# Patient Record
Sex: MALE | Race: WHITE | NOT HISPANIC OR LATINO | Employment: FULL TIME | URBAN - METROPOLITAN AREA
[De-identification: names, ages, dates, MRNs, and addresses within clinical notes are randomized per-mention and may not be internally consistent; named-entity substitution may affect disease eponyms.]

---

## 2022-08-20 DIAGNOSIS — Z00.00 ENCOUNTER FOR PREVENTIVE HEALTH EXAMINATION: ICD-10-CM

## 2022-09-27 ENCOUNTER — HOSPITAL ENCOUNTER (OUTPATIENT)
Dept: CT IMAGING | Facility: HOSPITAL | Age: 42
Discharge: HOME | End: 2022-09-27

## 2022-09-27 ENCOUNTER — APPOINTMENT (OUTPATIENT)
Dept: LAB | Facility: CLINIC | Age: 42
End: 2022-09-27

## 2022-09-27 ENCOUNTER — HOSPITAL ENCOUNTER (OUTPATIENT)
Dept: NON INVASIVE DIAGNOSTICS | Facility: CLINIC | Age: 42
Discharge: HOME | End: 2022-09-27

## 2022-09-27 ENCOUNTER — HOSPITAL ENCOUNTER (OUTPATIENT)
Dept: ULTRASOUND IMAGING | Facility: HOSPITAL | Age: 42
Discharge: HOME | End: 2022-09-27

## 2022-09-27 ENCOUNTER — HOSPITAL ENCOUNTER (OUTPATIENT)
Dept: VASCULAR ULTRASOUND | Facility: HOSPITAL | Age: 42
Discharge: HOME | End: 2022-09-27

## 2022-09-27 ENCOUNTER — OFFICE VISIT (OUTPATIENT)
Dept: FAMILY MEDICINE CLINIC | Facility: CLINIC | Age: 42
End: 2022-09-27

## 2022-09-27 VITALS
HEART RATE: 78 BPM | WEIGHT: 255.6 LBS | DIASTOLIC BLOOD PRESSURE: 86 MMHG | HEIGHT: 74 IN | SYSTOLIC BLOOD PRESSURE: 136 MMHG | BODY MASS INDEX: 32.8 KG/M2 | RESPIRATION RATE: 14 BRPM

## 2022-09-27 VITALS
HEIGHT: 74 IN | DIASTOLIC BLOOD PRESSURE: 86 MMHG | BODY MASS INDEX: 32.73 KG/M2 | WEIGHT: 255 LBS | HEART RATE: 78 BPM | SYSTOLIC BLOOD PRESSURE: 136 MMHG

## 2022-09-27 DIAGNOSIS — Z86.010 HISTORY OF COLON POLYPS: ICD-10-CM

## 2022-09-27 DIAGNOSIS — G89.29 CHRONIC RIGHT SHOULDER PAIN: ICD-10-CM

## 2022-09-27 DIAGNOSIS — M25.511 CHRONIC RIGHT SHOULDER PAIN: ICD-10-CM

## 2022-09-27 DIAGNOSIS — Z00.00 ENCOUNTER FOR PREVENTIVE HEALTH EXAMINATION: ICD-10-CM

## 2022-09-27 DIAGNOSIS — I10 PRIMARY HYPERTENSION: ICD-10-CM

## 2022-09-27 DIAGNOSIS — K76.0 FATTY LIVER: ICD-10-CM

## 2022-09-27 DIAGNOSIS — Z00.00 WELL ADULT EXAM: Primary | ICD-10-CM

## 2022-09-27 DIAGNOSIS — L98.9 SKIN LESION: ICD-10-CM

## 2022-09-27 DIAGNOSIS — E66.09 CLASS 1 OBESITY DUE TO EXCESS CALORIES WITH SERIOUS COMORBIDITY AND BODY MASS INDEX (BMI) OF 32.0 TO 32.9 IN ADULT: ICD-10-CM

## 2022-09-27 DIAGNOSIS — E11.9 TYPE 2 DIABETES MELLITUS WITHOUT COMPLICATION, WITHOUT LONG-TERM CURRENT USE OF INSULIN (HCC): ICD-10-CM

## 2022-09-27 DIAGNOSIS — I77.810 THORACIC AORTIC ECTASIA (HCC): ICD-10-CM

## 2022-09-27 PROBLEM — R74.01 ELEVATED ALT MEASUREMENT: Status: RESOLVED | Noted: 2022-09-27 | Resolved: 2022-09-27

## 2022-09-27 PROBLEM — Z86.0100 HISTORY OF COLON POLYPS: Status: ACTIVE | Noted: 2022-09-27

## 2022-09-27 PROBLEM — R74.01 ELEVATED ALT MEASUREMENT: Status: ACTIVE | Noted: 2022-09-27

## 2022-09-27 PROBLEM — E66.811 CLASS 1 OBESITY DUE TO EXCESS CALORIES WITH SERIOUS COMORBIDITY AND BODY MASS INDEX (BMI) OF 32.0 TO 32.9 IN ADULT: Status: ACTIVE | Noted: 2022-09-27

## 2022-09-27 LAB
25(OH)D3 SERPL-MCNC: 40.2 NG/ML (ref 30–100)
ALBUMIN SERPL BCP-MCNC: 4.5 G/DL (ref 3.5–5)
ALP SERPL-CCNC: 73 U/L (ref 34–104)
ALT SERPL W P-5'-P-CCNC: 67 U/L (ref 7–52)
ANION GAP SERPL CALCULATED.3IONS-SCNC: 10 MMOL/L (ref 4–13)
AORTIC ROOT: 3.2 CM
APICAL FOUR CHAMBER EJECTION FRACTION: 65 %
ASCENDING AORTA: 3.3 CM
AST SERPL W P-5'-P-CCNC: 32 U/L (ref 13–39)
ATRIAL RATE: 91 BPM
BACTERIA UR QL AUTO: ABNORMAL /HPF
BASOPHILS # BLD AUTO: 0.05 THOUSANDS/ΜL (ref 0–0.1)
BASOPHILS NFR BLD AUTO: 1 % (ref 0–1)
BILIRUB SERPL-MCNC: 0.8 MG/DL (ref 0.2–1)
BILIRUB UR QL STRIP: NEGATIVE
BUN SERPL-MCNC: 14 MG/DL (ref 5–25)
CALCIUM SERPL-MCNC: 9.1 MG/DL (ref 8.4–10.2)
CHLORIDE SERPL-SCNC: 102 MMOL/L (ref 96–108)
CHOLEST SERPL-MCNC: 128 MG/DL
CLARITY UR: CLEAR
CO2 SERPL-SCNC: 24 MMOL/L (ref 21–32)
COLOR UR: ABNORMAL
CREAT SERPL-MCNC: 1.01 MG/DL (ref 0.6–1.3)
CRP SERPL HS-MCNC: 1.88 MG/L
E WAVE DECELERATION TIME: 191 MS
EOSINOPHIL # BLD AUTO: 0.1 THOUSAND/ΜL (ref 0–0.61)
EOSINOPHIL NFR BLD AUTO: 2 % (ref 0–6)
ERYTHROCYTE [DISTWIDTH] IN BLOOD BY AUTOMATED COUNT: 12.2 % (ref 11.6–15.1)
EST. AVERAGE GLUCOSE BLD GHB EST-MCNC: 209 MG/DL
FRACTIONAL SHORTENING: 36 % (ref 28–44)
GFR SERPL CREATININE-BSD FRML MDRD: 91 ML/MIN/1.73SQ M
GLUCOSE P FAST SERPL-MCNC: 177 MG/DL (ref 65–99)
GLUCOSE UR STRIP-MCNC: ABNORMAL MG/DL
HBA1C MFR BLD: 8.9 %
HCT VFR BLD AUTO: 46.6 % (ref 36.5–49.3)
HCV AB SER QL: NORMAL
HDLC SERPL-MCNC: 38 MG/DL
HGB BLD-MCNC: 16.6 G/DL (ref 12–17)
HGB UR QL STRIP.AUTO: NEGATIVE
IMM GRANULOCYTES # BLD AUTO: 0.03 THOUSAND/UL (ref 0–0.2)
IMM GRANULOCYTES NFR BLD AUTO: 1 % (ref 0–2)
INTERVENTRICULAR SEPTUM IN DIASTOLE (PARASTERNAL SHORT AXIS VIEW): 1.1 CM
INTERVENTRICULAR SEPTUM: 1.1 CM (ref 0.6–1.1)
KETONES UR STRIP-MCNC: NEGATIVE MG/DL
LAAS-AP2: 24 CM2
LAAS-AP4: 17.9 CM2
LDLC SERPL CALC-MCNC: 64 MG/DL (ref 0–100)
LEFT ATRIUM SIZE: 3.6 CM
LEFT INTERNAL DIMENSION IN SYSTOLE: 3 CM (ref 2.1–4)
LEFT VENTRICULAR INTERNAL DIMENSION IN DIASTOLE: 4.7 CM (ref 3.5–6)
LEFT VENTRICULAR POSTERIOR WALL IN END DIASTOLE: 1.1 CM
LEFT VENTRICULAR STROKE VOLUME: 64 ML
LEUKOCYTE ESTERASE UR QL STRIP: NEGATIVE
LVSV (TEICH): 64 ML
LYMPHOCYTES # BLD AUTO: 1.99 THOUSANDS/ΜL (ref 0.6–4.47)
LYMPHOCYTES NFR BLD AUTO: 34 % (ref 14–44)
MAX HR PERCENT: 104 %
MAX HR: 187 BPM
MCH RBC QN AUTO: 33 PG (ref 26.8–34.3)
MCHC RBC AUTO-ENTMCNC: 35.6 G/DL (ref 31.4–37.4)
MCV RBC AUTO: 93 FL (ref 82–98)
MONOCYTES # BLD AUTO: 0.42 THOUSAND/ΜL (ref 0.17–1.22)
MONOCYTES NFR BLD AUTO: 7 % (ref 4–12)
MV E'TISSUE VEL-SEP: 8 CM/S
MV PEAK A VEL: 0.65 M/S
MV PEAK E VEL: 60 CM/S
MV STENOSIS PRESSURE HALF TIME: 56 MS
MV VALVE AREA P 1/2 METHOD: 3.93 CM2
NEUTROPHILS # BLD AUTO: 3.33 THOUSANDS/ΜL (ref 1.85–7.62)
NEUTS SEG NFR BLD AUTO: 55 % (ref 43–75)
NITRITE UR QL STRIP: NEGATIVE
NON-SQ EPI CELLS URNS QL MICRO: ABNORMAL /HPF
NRBC BLD AUTO-RTO: 0 /100 WBCS
P AXIS: 20 DEGREES
PH UR STRIP.AUTO: 5.5 [PH]
PLATELET # BLD AUTO: 249 THOUSANDS/UL (ref 149–390)
PMV BLD AUTO: 8 FL (ref 8.9–12.7)
POTASSIUM SERPL-SCNC: 4.2 MMOL/L (ref 3.5–5.3)
PR INTERVAL: 134 MS
PROT SERPL-MCNC: 7.4 G/DL (ref 6.4–8.4)
PROT UR STRIP-MCNC: NEGATIVE MG/DL
PSA SERPL-MCNC: 0.6 NG/ML (ref 0–4)
QRS AXIS: 0 DEGREES
QRSD INTERVAL: 98 MS
QT INTERVAL: 356 MS
QTC INTERVAL: 437 MS
RATE PRESSURE PRODUCT: NORMAL
RBC # BLD AUTO: 5.03 MILLION/UL (ref 3.88–5.62)
RBC #/AREA URNS AUTO: ABNORMAL /HPF
RIGHT ATRIUM AREA SYSTOLE A4C: 20.5 CM2
RIGHT VENTRICLE ID DIMENSION: 4.5 CM
SL CV LEFT ATRIUM LENGTH A2C: 5.9 CM
SL CV LV EF: 55
SL CV PED ECHO LEFT VENTRICLE DIASTOLIC VOLUME (MOD BIPLANE) 2D: 100 ML
SL CV PED ECHO LEFT VENTRICLE SYSTOLIC VOLUME (MOD BIPLANE) 2D: 36 ML
SL CV STRESS RECOVERY BP: NORMAL MMHG
SL CV STRESS RECOVERY HR: 125 BPM
SL CV STRESS RECOVERY O2 SAT: 98 %
SODIUM SERPL-SCNC: 136 MMOL/L (ref 135–147)
SP GR UR STRIP.AUTO: 1.01 (ref 1–1.03)
STRESS ANGINA INDEX: 0
STRESS BASELINE BP: NORMAL MMHG
STRESS BASELINE HR: 96 BPM
STRESS O2 SAT REST: 98 %
STRESS PEAK HR: 187 BPM
STRESS POST ESTIMATED WORKLOAD: 11.7 METS
STRESS POST EXERCISE DUR MIN: 10 MIN
STRESS POST O2 SAT PEAK: 97 %
STRESS POST PEAK BP: 184 MMHG
T WAVE AXIS: 30 DEGREES
TR MAX PG: 18 MMHG
TR PEAK VELOCITY: 2.1 M/S
TRICUSPID VALVE PEAK REGURGITATION VELOCITY: 2.14 M/S
TRIGL SERPL-MCNC: 130 MG/DL
TSH SERPL DL<=0.05 MIU/L-ACNC: 1.77 UIU/ML (ref 0.45–4.5)
UROBILINOGEN UR STRIP-ACNC: <2 MG/DL
VENTRICULAR RATE: 91 BPM
WBC # BLD AUTO: 5.92 THOUSAND/UL (ref 4.31–10.16)
WBC #/AREA URNS AUTO: ABNORMAL /HPF

## 2022-09-27 PROCEDURE — 85025 COMPLETE CBC W/AUTO DIFF WBC: CPT

## 2022-09-27 PROCEDURE — 93306 TTE W/DOPPLER COMPLETE: CPT | Performed by: INTERNAL MEDICINE

## 2022-09-27 PROCEDURE — 99499EX: Performed by: FAMILY MEDICINE

## 2022-09-27 PROCEDURE — 83036 HEMOGLOBIN GLYCOSYLATED A1C: CPT

## 2022-09-27 PROCEDURE — 86141 C-REACTIVE PROTEIN HS: CPT

## 2022-09-27 PROCEDURE — 93350 STRESS TTE ONLY: CPT | Performed by: INTERNAL MEDICINE

## 2022-09-27 PROCEDURE — 93922 UPR/L XTREMITY ART 2 LEVELS: CPT

## 2022-09-27 PROCEDURE — 81001 URINALYSIS AUTO W/SCOPE: CPT

## 2022-09-27 PROCEDURE — 93306 TTE W/DOPPLER COMPLETE: CPT

## 2022-09-27 PROCEDURE — 86803 HEPATITIS C AB TEST: CPT

## 2022-09-27 PROCEDURE — 84443 ASSAY THYROID STIM HORMONE: CPT

## 2022-09-27 PROCEDURE — 80061 LIPID PANEL: CPT

## 2022-09-27 PROCEDURE — 76700 US EXAM ABDOM COMPLETE: CPT

## 2022-09-27 PROCEDURE — 93350 STRESS TTE ONLY: CPT

## 2022-09-27 PROCEDURE — 80053 COMPREHEN METABOLIC PANEL: CPT

## 2022-09-27 PROCEDURE — 82306 VITAMIN D 25 HYDROXY: CPT

## 2022-09-27 PROCEDURE — VASC: Performed by: SURGERY

## 2022-09-27 PROCEDURE — 93005 ELECTROCARDIOGRAM TRACING: CPT

## 2022-09-27 PROCEDURE — 75571 CT HRT W/O DYE W/CA TEST: CPT

## 2022-09-27 PROCEDURE — 84153 ASSAY OF PSA TOTAL: CPT

## 2022-09-27 PROCEDURE — 36415 COLL VENOUS BLD VENIPUNCTURE: CPT

## 2022-09-27 PROCEDURE — 93010 ELECTROCARDIOGRAM REPORT: CPT | Performed by: INTERNAL MEDICINE

## 2022-09-27 PROCEDURE — G1004 CDSM NDSC: HCPCS

## 2022-09-27 RX ORDER — GLIPIZIDE 5 MG/1
5 TABLET, FILM COATED, EXTENDED RELEASE ORAL 2 TIMES DAILY
Qty: 60 TABLET | Refills: 0
Start: 2022-09-27 | End: 2022-10-05 | Stop reason: ALTCHOICE

## 2022-09-27 RX ORDER — SIMVASTATIN 20 MG
20 TABLET ORAL
Qty: 30 TABLET | Refills: 0
Start: 2022-09-27

## 2022-09-27 RX ORDER — LISINOPRIL 10 MG/1
10 TABLET ORAL DAILY
Qty: 90 TABLET | Refills: 0 | Status: SHIPPED | OUTPATIENT
Start: 2022-09-27

## 2022-09-27 NOTE — PROGRESS NOTES
[facility] Physical Exam     Breanna Mccormick is a 39 y o  male who is presenting for his first [facility] Physical Exam at [address]  Mr Vanesa Hammond is the  at [employer]  He resides in [place], [place] with his wife and 2 children  Mr Brown's past medical history is significant for diabetes  He states his control has been variable (last A1c was in the 7s, but he has been as low as 4)  Also history of ongoing right shoulder problems, for which she has been seeing Orthopedics  His past surgical history is significant for tonsillectomy in 2002, and LASIK in 2012  His family history is significant for diabetes, hypertension, hyperlipidemia, and chronic kidney disease    Mr Vanesa Hammond is a nonsmoker  He drinks approximately 3-4 alcoholic beverages per week on average  He drinks 1 cup of coffee per day  He is  with 2 children  He states his diet is somewhat healthy  He does not exercise as often as he would like  His biggest concerns today include his diabetes, diet, exercise, and weight  Review of Systems   Constitutional: Negative for chills and fever  HENT: Negative for ear pain and sore throat  Eyes: Negative for pain and visual disturbance  Respiratory: Negative for cough and shortness of breath  Cardiovascular: Negative for chest pain and palpitations  Gastrointestinal: Negative for abdominal pain and vomiting  Genitourinary: Negative for dysuria and hematuria  Musculoskeletal: Positive for arthralgias  Negative for back pain  Skin: Negative for color change and rash  Neurological: Negative for seizures and syncope  All other systems reviewed and are negative          Active Ambulatory Problems     Diagnosis Date Noted    Well adult exam 09/27/2022    Skin lesion 09/27/2022    Type 2 diabetes mellitus without complication, without long-term current use of insulin ([facility] ) 09/27/2022    Chronic right shoulder pain 09/27/2022    History of colon polyps 09/27/2022    Class 1 obesity due to excess calories with serious comorbidity and body mass index (BMI) of 32 0 to 32 9 in adult 09/27/2022    Fatty liver 09/27/2022    Primary hypertension 09/27/2022    [facility] 09/27/2022     Resolved Ambulatory Problems     Diagnosis Date Noted    Elevated ALT measurement 09/27/2022     Past Medical History:   Diagnosis Date    Diabetes mellitus ([facility] )        Past Surgical History:   Procedure Laterality Date    EYE SURGERY  2012    LASIK    TONSILLECTOMY Bilateral 2002       Family History   Problem Relation Age of Onset    Hypertension Mother     Hyperlipidemia Mother     Diabetes Mother     Hypertension Father     Diabetes Father        Social History     Tobacco Use   Smoking Status Never Smoker   Smokeless Tobacco Never Used         Current Outpatient Medications:     glipiZIDE (GLUCOTROL XL) 5 mg 24 hr tablet, Take 1 tablet (5 mg total) by mouth 2 (two) times a day, Disp: 60 tablet, Rfl: 0    lisinopril (ZESTRIL) 10 mg tablet, Take 1 tablet (10 mg total) by mouth daily, Disp: 90 tablet, Rfl: 0    metFORMIN (GLUCOPHAGE) 500 mg tablet, Take 1 tablet (500 mg total) by mouth 2 (two) times a day with meals, Disp: 60 tablet, Rfl: 0    simvastatin (ZOCOR) 20 mg tablet, Take 1 tablet (20 mg total) by mouth daily at bedtime, Disp: 30 tablet, Rfl: 0    No Known Allergies      Objective:    Vitals:    09/27/22 1053   BP: 136/86   Pulse: 78   Resp: 14   Weight: 116 kg (255 lb 9 6 oz)   Height: 6' 2" (1 88 m)        Physical Exam  Constitutional:       Appearance: Normal appearance  HENT:      Head: Normocephalic and atraumatic  Right Ear: Tympanic membrane, ear canal and external ear normal  There is no impacted cerumen  Left Ear: Tympanic membrane, ear canal and external ear normal  There is no impacted cerumen        Nose: Nose normal       Mouth/Throat:      Mouth: Mucous membranes are moist       Pharynx: Oropharynx is clear  No posterior oropharyngeal erythema  Eyes:      Extraocular Movements: Extraocular movements intact  Conjunctiva/sclera: Conjunctivae normal       Pupils: Pupils are equal, round, and reactive to light  Neck:      Vascular: No carotid bruit  Cardiovascular:      Rate and Rhythm: Normal rate and regular rhythm  Pulses: Normal pulses  Heart sounds: Normal heart sounds  No murmur heard  Pulmonary:      Effort: Pulmonary effort is normal       Breath sounds: Normal breath sounds  Abdominal:      General: Abdomen is flat  Bowel sounds are normal  There is no distension  Palpations: Abdomen is soft  There is no mass  Tenderness: There is no abdominal tenderness  Hernia: No hernia is present  Genitourinary:     Prostate: Normal       Rectum: Guaiac result negative  Musculoskeletal:         General: Normal range of motion  Cervical back: Normal range of motion and neck supple  Lymphadenopathy:      Cervical: No cervical adenopathy  Skin:     General: Skin is warm  Capillary Refill: Capillary refill takes less than 2 seconds  Coloration: Skin is not jaundiced  Findings: No rash  Neurological:      General: No focal deficit present  Mental Status: He is alert and oriented to person, place, and time  Cranial Nerves: No cranial nerve deficit  Motor: No weakness  Gait: Gait normal    Psychiatric:         Mood and Affect: Mood normal          Behavior: Behavior normal          Thought Content: Thought content normal          Judgment: Judgment normal              Assessment/Plan:     Here are the findings from today's exam:(also see cardiology and dermatology reports)        1  Well adult exam  Assessment & Plan:  (also please see assessment/plan for listed diagnoses)  Your current with colon cancer screening  Recommend repeat colonoscopy in 5 years due to history of colon polyps    Your last tetanus booster was 2022 (next booster due in 10 years)  You have had COVID vaccination, and booster  Recommend continue healthy diet and regular exercise program       2  Type 2 diabetes mellitus without complication, without long-term current use of insulin [facility])  Assessment & Plan:    Lab Results   Component Value Date    HGBA1C 8 9 (H) 09/27/2022   Unfortunately your diabetic control is poor  Your current A1c is 8 9%  I would recommend contacting your family doctor in the near future for recommendations  I will also place an order for you to see an endocrinologist     Orders:  -     metFORMIN (GLUCOPHAGE) 500 mg tablet; Take 1 tablet (500 mg total) by mouth 2 (two) times a day with meals  -     glipiZIDE (GLUCOTROL XL) 5 mg 24 hr tablet; Take 1 tablet (5 mg total) by mouth 2 (two) times a day  -     simvastatin (ZOCOR) 20 mg tablet; Take 1 tablet (20 mg total) by mouth daily at bedtime  -     Ambulatory Referral to Endocrinology; Future    3  Primary hypertension  Assessment & Plan: Your blood pressure today was suboptimal   Due to the fact that you are diabetic, I would recommend starting a medication to help lower your blood pressure, and protect her kidneys  Medication is call lisinopril 10 mg, 1 tablet daily  This medication is usually very well tolerated, but a small percentage of people will develop a dry cough  If this occurs, please contact me  I will send prescription to your pharmacy  I would recommend having her blood pressure recheck again within the next few months  Orders:  -     lisinopril (ZESTRIL) 10 mg tablet; Take 1 tablet (10 mg total) by mouth daily    4  Class 1 obesity due to excess calories with serious comorbidity and body mass index (BMI) of 32 0 to 32 9 in adult  Assessment & Plan: Your current weight today is 255 lb (BMI 32 82)  This puts you in the "obese" category  I would recommend at least a 20-30 lb weight loss    Please follow the recommendations/advice given to you by our registered dietitian and exercise physiologist       5  Thoracic aortic ectasia [facility])  Assessment & Plan:  A portion of your thoracic aorta is widened  This is called ectasia  This can lead to an aneurysm  Fortunately, you have be having this followed  I would recommend continued surveillance with CT chest scans every 1-2 years  6  Fatty liver  Assessment & Plan: Your abdominal ultrasound showed fatty infiltration in your liver  Also, 1 of your liver function enzymes (ALT) was mildly elevated  This can have multiple causes, but in your case most likely is due to obesity  I would recommend working on lifestyle modification; improved diabetic control, exercise, weight loss  7  Skin lesion  Assessment & Plan:  You have a skin lesion on your abdomen that has been changing in appearance  Are dermatologist has recommended that you have this excised (and biopsied)  We will help to coordinate an appointment with  [facility] for you to have this done  Orders:  -     Ambulatory Referral to Dermatology; Future    8  Chronic right shoulder pain  Assessment & Plan: You mentioned that you are having ongoing chronic pain in your right shoulder  If her symptoms persist, you may benefit from advanced imaging such as MRI  I would recommend continued follow-up with your orthopedic specialist at [facility]  9  History of colon polyps  Assessment & Plan: You mentioned that you have history of colon polyps  Fortunately your last colonoscopy was last year  I would recommend continuing follow-up as directed with your gastroenterologist in 5 years  Michaelle Mcdermott,     Thank you for choosing  [facility]  It was a pleasure meeting and getting to know you today  If you have any questions regarding today's exam, feel free to contact me  We hope to see you again in the future  Anjana Guadalupe ([facility]  Physician)  [phone] (cell)  [email or web address]

## 2022-09-27 NOTE — ASSESSMENT & PLAN NOTE
Your abdominal ultrasound showed fatty infiltration in your liver  Also, 1 of your liver function enzymes (ALT) was mildly elevated  This can have multiple causes, but in your case most likely is due to obesity  I would recommend working on lifestyle modification; improved diabetic control, exercise, weight loss

## 2022-09-27 NOTE — PROGRESS NOTES
Hearing Assessment Summary:     Hearing Screening    125Hz 250Hz 500Hz 1000Hz 2000Hz 3000Hz 4000Hz 6000Hz 8000Hz   Right ear:  55 55 48 30 35 39 50 40   Left ear:  28 40 36 55 55 55 50 45   Comments: HEARING EVALUATION    Name:  Kamini Setarns  :  1980  Age:  39 y o  Date of Evaluation: 22     History: [facility] Exam  Reason for visit: Kamini Stearns is being seen today for an evaluation of hearing as part of an [facility] examination  Patient reports known history of bilateral hearing loss  He states that he tried some over the counter amplifiers, but was bothered by environmental sounds  He reports constant bilateral tinnitus (ringing), and denies ear pain and dizziness  There is no significant history of noise exposure  He reports that his uncle has hearing loss as well  EVALUATION:    Otoscopic Evaluation:   Right Ear: Clear and healthy ear canal and tympanic membrane   Left Ear: Clear and healthy ear canal and tympanic membrane    Tympanometry:   Right: Type A - normal middle ear pressure and compliance   Left: Type A - normal middle ear pressure and compliance    Audiogram Results:  Right: Moderate to mild sensorineural hearing loss with 92% speech discrimination ability  Left: Mild to moderate mixed hearing loss with 88% speech discrimination ability  *see attached audiogram      RECOMMENDATIONS:  Annual hearing eval, Consult ENT, Return to [facility]  for F/U, Hearing Aid Evaluation, Medical Clearance and Copy to Patient/Caregiver    PATIENT EDUCATION:   Discussed results and recommendations with patient  Questions were addressed and the patient was encouraged to contact our department should concerns arise        Sangeetha Sorenson   Clinical Audiologist'       Visual Acuity Screening    Right eye Left eye Both eyes   Without correction:      With correction: 20/15 20/20 20/15

## 2022-09-27 NOTE — ASSESSMENT & PLAN NOTE
Your current weight today is 255 lb (BMI 32 82)  This puts you in the "obese" category  I would recommend at least a 20-30 lb weight loss    Please follow the recommendations/advice given to you by our registered dietitian and exercise physiologist

## 2022-09-27 NOTE — ASSESSMENT & PLAN NOTE
A portion of your thoracic aorta is widened  This is called ectasia  This can lead to an aneurysm  Fortunately, you have be having this followed  I would recommend continued surveillance with CT chest scans every 1-2 years

## 2022-09-27 NOTE — PROGRESS NOTES
Fitness Summary and Recommendations:  Stacy Goddard scored at the 5% on his body composition assessment with a bodyfat % of 31 7%  Stacy Goddard scored in the average range for flexibility with a sit & reach score of 22 cm  His Muscle Strength/Endurance scores placed him at the 35% (lower body) and 75% (upper body) with a chair stand score of 22 and arm curl score of 27 respectively  Osorios Cardiovascular Score of 47 2 placed him at the 85%  Overall, Stacy Goddard would be considered to have an average fitness level with a 50% score  Continued emphasis on regular exercise and sound nutrition practices will enable Stacy Goddard to reach his optimal level of fitness

## 2022-09-27 NOTE — ASSESSMENT & PLAN NOTE
Lab Results   Component Value Date    HGBA1C 8 9 (H) 09/27/2022   Unfortunately your diabetic control is poor  Your current A1c is 8 9%  I would recommend contacting your family doctor in the near future for recommendations    I will also place an order for you to see an endocrinologist

## 2022-09-27 NOTE — ASSESSMENT & PLAN NOTE
Your blood pressure today was suboptimal   Due to the fact that you are diabetic, I would recommend starting a medication to help lower your blood pressure, and protect her kidneys  Medication is call lisinopril 10 mg, 1 tablet daily  This medication is usually very well tolerated, but a small percentage of people will develop a dry cough  If this occurs, please contact me  I will send prescription to your pharmacy  I would recommend having her blood pressure recheck again within the next few months

## 2022-09-27 NOTE — ASSESSMENT & PLAN NOTE
You mentioned that you are having ongoing chronic pain in your right shoulder  If her symptoms persist, you may benefit from advanced imaging such as MRI  I would recommend continued follow-up with your orthopedic specialist at [facility]

## 2022-09-27 NOTE — ASSESSMENT & PLAN NOTE
You mentioned that you have history of colon polyps  Fortunately your last colonoscopy was last year  I would recommend continuing follow-up as directed with your gastroenterologist in 5 years

## 2022-09-27 NOTE — ASSESSMENT & PLAN NOTE
You have a skin lesion on your abdomen that has been changing in appearance  Are dermatologist has recommended that you have this excised (and biopsied)  We will help to coordinate an appointment with [address] [place] [address] Dermatology for you to have this done

## 2022-09-27 NOTE — PROGRESS NOTES
HEART AND VASCULAR SUMMARY     1  Blood pressure: 136/86 mmHg  , Pulse: 78     2  Lipids: Total 128, , HDL 38, LDL 64, hs C-RP 1 8      3  ECG: Normal sinus rhythm     4  Echocardiogram: Structurally normal heart      5  Stress ECHO: No myocardial ischemia  Reasonable exercise capacity      6  Coronary Calcium CT: 0, mildly dilated thoracic aorta      7  Cardiovascular risk score: 1 7% risk of heart disease or stroke over next 10 years          Impression and Recommendations:     Mr Bob Trujillo is a 39year old man with dyslipidemia and diabetes who presents for an [facility]   He is asymptomatic from a cardiac standpoint  He has borderline elevated blood pressure, and ascending thoracic aorta is mildly dilated at 41mm  This has been followed by [facility]ologist at [facility] in past      1  Reasonable exercise capacity  However, is somewhat deconditioned  2  Dyslipidemia - good control on statin  3  Obesity - continue with healthy diet and increasing exercise regimen  4  Borderline hypertension - with underlying diabetes, would start ACE-I/ARB for blood pressure and renal protection  Mildly dilated ascending thoracic aorta - 41mm  Likely no significant changes from past    Would follow up with prior physicians that followed the aortic dilation previously, and consider reimaging in 2 years with non-contrast CT as follow up

## 2022-09-27 NOTE — ASSESSMENT & PLAN NOTE
(also please see assessment/plan for listed diagnoses)  Your current with colon cancer screening  Recommend repeat colonoscopy in 5 years due to history of colon polyps  Your last tetanus booster was 2022 (next booster due in 10 years)  You have had COVID vaccination, and booster    Recommend continue healthy diet and regular exercise program

## 2022-09-27 NOTE — PROGRESS NOTES
Nutritional Summary and Recommendations:     Patient Nutrition-Oriented Medical/Diet Hx  Zoila Reina presents today to [facility] for nutrition assessment and counseling  Per 24-hour dietary recall, Zoila Reina reports typically skipping breakfast, having a salad with fruit and cheese stick, and dinner mostly prepared at home  Zoila Reina reports knowledge base of what to eat for diabetes however does not always follow recommedations and struggles with consistent healthy intake  Discussion focused on ways to improve Hemoglobin A1C levels  Reviewed label reading and consistent carbohydrate intake throughout the day  Labs reviewed  Nutrition Related Medications/Supplements:  MVI  Metformin  Glipizide     Labs:  A1C 8 9  Total Cholesterol: 128  Triglycerides: 130  HDL: 38  LDL: 64  Vitamin D 40 2      Anthropometrics:     Ht: 6 ft 2 in   Wt: 255 6 lb    BMI: 32 8     BMR: 2329 kcals  Fat%: 31 7%  Acceptable Range: 11-22%     Fat Mass: 81 lb  FFM: 174 6 lb  TBW: 127 8 lb     Nutrition Diagnosis:     EH Common Nutrition Dx: Altered nutrition related laboratory values  r/t physiological issues as evidenced by A1C 8 9       Nutrition Interventions:  Eat a well-balanced breakfast daily to include a protein and complex carbohydrate  Aim for 60-75 grams (3-4 servings) of carbohydrates per meal and 15 grams (1 serving) of carbohydrates for snacks  Increase daily vegetable intake by eating vegetables with lunch and dinner  Use [employer]Pal or other meal tracker anabel to track calories and carbohydrate intake  When traveling for work, bring or shop for meals and snacks to have in place of going out to eat  Nutrition Recommendations:    1  Reduce A1C levels < 8 9% within 3-6 months  2  Aim for gradual weight loss of 1-2 lb per week  3  Reduce body fat mass by next visit  4  Contact [facility] for Diabetes Management  5  Contact RD with any questions or concerns      Nutrition Education Diabetes Nutrition Education and Healthy Snacking       Perceived Comprehension: Good      Expected Compliance: Good

## 2022-09-28 ENCOUNTER — TELEPHONE (OUTPATIENT)
Dept: DERMATOLOGY | Facility: CLINIC | Age: 42
End: 2022-09-28

## 2022-09-28 NOTE — TELEPHONE ENCOUNTER
rec an email from Raheem Casey from [facility]  to get this pt scheduled for a shaved biopsy of a lesion on his abdomen, I called the pt to get scheduled but n/a so I advised to cb and get scheduled with Dr Alicja James for CV or the next open appt for her, left cb info   ashley

## 2022-09-29 LAB
CHEST PAIN STATEMENT: NORMAL
MAX DIASTOLIC BP: 84 MMHG
MAX HEART RATE: 187 BPM
MAX PREDICTED HEART RATE: 179 BPM
MAX. SYSTOLIC BP: 184 MMHG
PROTOCOL NAME: NORMAL
REASON FOR TERMINATION: NORMAL
TARGET HR FORMULA: NORMAL
TEST INDICATION: NORMAL
TIME IN EXERCISE PHASE: NORMAL

## 2022-10-05 ENCOUNTER — CONSULT (OUTPATIENT)
Dept: ENDOCRINOLOGY | Facility: HOSPITAL | Age: 42
End: 2022-10-05

## 2022-10-05 VITALS
BODY MASS INDEX: 33.24 KG/M2 | WEIGHT: 259 LBS | DIASTOLIC BLOOD PRESSURE: 80 MMHG | SYSTOLIC BLOOD PRESSURE: 122 MMHG | HEIGHT: 74 IN | HEART RATE: 97 BPM

## 2022-10-05 DIAGNOSIS — I10 PRIMARY HYPERTENSION: ICD-10-CM

## 2022-10-05 DIAGNOSIS — E66.09 CLASS 1 OBESITY DUE TO EXCESS CALORIES WITH SERIOUS COMORBIDITY AND BODY MASS INDEX (BMI) OF 32.0 TO 32.9 IN ADULT: ICD-10-CM

## 2022-10-05 DIAGNOSIS — E11.9 TYPE 2 DIABETES MELLITUS WITHOUT COMPLICATION, WITHOUT LONG-TERM CURRENT USE OF INSULIN (HCC): Primary | ICD-10-CM

## 2022-10-05 PROCEDURE — 99203 OFFICE O/P NEW LOW 30 MIN: CPT | Performed by: STUDENT IN AN ORGANIZED HEALTH CARE EDUCATION/TRAINING PROGRAM

## 2022-10-05 RX ORDER — FLASH GLUCOSE SENSOR
1 KIT MISCELLANEOUS
Qty: 6 EACH | Refills: 1 | Status: SHIPPED | OUTPATIENT
Start: 2022-10-05

## 2022-10-05 NOTE — PROGRESS NOTES
New Patient Consult Note      Chief Complaint   Patient presents with    Diabetes Type 2      Referring Provider  Fer Gillis Md  [address], [address],  [address]     History of Present Illness:   Mello Quiroga is a 39 y o  male with a history of type 2 diabetes 7-8 years ago without any micro/macrovascular complication, newly diagnosed hypertension, BMI >30 who presents today for his diabetic care  Patient was started on metformin initially with good control (HbA1C  6-7%) however HbA1C increased upto 10% 3 years ago, started on Glipizide by PCP  Worked on diet, cut back on alcohol and also worked out more and ate better, improved to 7% range  Since last year HbA1C has started to raise again  Attributed this to weight gain and also poor lifestyle choices as he relaxed on his exercise routine since  He would like to get back on track and control his BG better specially since mother also has T2DM on insulin  Also interested in CGM to provide more BG data as he is a data driven person  Used to check BG every now and then before until Feb 2022- range /120  Unfortunately gained weight/relaxed activity level since and feels his BG have possibly not been doing well since  Weight 252-253 lbs at home scale, gained 20lbs in the last year  Previous HbA1C was 5 7% 08/2021 then increased to  8 6% now 8 9% 2 weeks ago  Patient had a recent [facility] screening including Coronary calcium scoring and carotid US and was told everything was ok, but suggested visit with endocrine for his diabetes  Patient also diagnosed with Steato-hepatitis  Current regimen: Metformin 500mg BID, Glipizide 5mg BID  Hypoglycemia symptoms: Had few episodes of low BG down to 50's when he was working out a lot  Symptoms include clammy, tired, nauseous  Once-twice a week  This was few years ago- was on glipizide reduced back to one pill then which resolved symptoms  Now back upto 2 pills       Diet: Diet remains good  Doesn't eat the healtist but not the worse  Bk:- IF eats bk will be at diner, Ln:- Salad, sugar free jello, string cheese or lunch meat roll ups, Dn:- Wife cooks, chicken chicken nuggets, salmon  Snacks eats lots of fruit  If traveling for work diet can be off      diabetic ROS: Always drinks lots of fluid, does feel more thristy when BG high which he has been feeling lately  Usually wakes up one time a night urinate- unchanaged  Does report feeling very tired since last 2 months  Occasionally can have blurry vision when very tired  Right pinky gets numb, has shoulder issue which maybe causing this  No known chronic neuropathy issues, no open wounds/ulcers or difficult to heal wounds  Opthamology: Couple of years ago, is supposed to get one soon  Knows to get one yearly  Infuenza vaccine: Hasnt got one yet, will be going to get it  Has hypertension: followed by PCP; on ACE inhibitor/ARB, On lisinopril 10 mg, Managed by PCP  BP today 122/80  Has hyperlipidemia: followed by PCP; on statin - tolerating well, no myalgias  compliant most of the time  denies any side effects from medications  History of pancreatitis: None     Family history:- Mother has T2DM, No other family history of diabetes or early onset heart problems  Social history:- drink occasionally, does not smoke or do any recreational drugs  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 [employer]       Patient Active Problem List   Diagnosis    Well adult exam    Skin lesion    Type 2 diabetes mellitus without complication, without long-term current use of insulin (HCC)    Chronic right shoulder pain    History of colon polyps    Class 1 obesity due to excess calories with serious comorbidity and body mass index (BMI) of 32 0 to 32 9 in adult    Fatty liver    Primary hypertension    Thoracic aortic ectasia (HCC)      Past Medical History:   Diagnosis Date    Diabetes mellitus ([facility] [address] )       Past Surgical History:   Procedure Laterality Date    EYE SURGERY  2012    LASIK    TONSILLECTOMY Bilateral 2002      Family History   Problem Relation Age of Onset    Hypertension Mother     Hyperlipidemia Mother     Diabetes Mother     Hypertension Father     Diabetes Father      Social History     Tobacco Use    Smoking status: Never Smoker    Smokeless tobacco: Never Used   Substance Use Topics    Alcohol use: Yes     Alcohol/week: 4 0 standard drinks     Types: 4 Standard drinks or equivalent per week     No Known Allergies      Current Outpatient Medications:     glipiZIDE (GLUCOTROL XL) 5 mg 24 hr tablet, Take 1 tablet (5 mg total) by mouth 2 (two) times a day, Disp: 60 tablet, Rfl: 0    lisinopril (ZESTRIL) 10 mg tablet, Take 1 tablet (10 mg total) by mouth daily, Disp: 90 tablet, Rfl: 0    metFORMIN (GLUCOPHAGE) 500 mg tablet, Take 1 tablet (500 mg total) by mouth 2 (two) times a day with meals, Disp: 60 tablet, Rfl: 0    simvastatin (ZOCOR) 20 mg tablet, Take 1 tablet (20 mg total) by mouth daily at bedtime, Disp: 30 tablet, Rfl: 0  Review of Systems   Constitutional: Positive for fatigue  Negative for activity change, appetite change and unexpected weight change  Respiratory: Negative for shortness of breath  Cardiovascular: Negative for palpitations and leg swelling  Gastrointestinal: Negative for abdominal pain, diarrhea and nausea  Endocrine: Positive for polydipsia  Negative for polyphagia and polyuria  Musculoskeletal: Negative for arthralgias and myalgias  Skin: Negative for color change  Neurological: Negative for dizziness, tremors, weakness and light-headedness  Physical Exam:  Body mass index is 33 25 kg/m²    /80   Pulse 97   Ht 6' 2" (1 88 m)   Wt 117 kg (259 lb)   BMI 33 25 kg/m²    Wt Readings from Last 3 Encounters:   10/05/22 117 kg (259 lb)   09/27/22 116 kg (255 lb)   09/27/22 116 kg (255 lb 9 6 oz)     Physical Exam  Constitutional: Appearance: Normal appearance  Eyes:      Pupils: Pupils are equal, round, and reactive to light  Cardiovascular:      Rate and Rhythm: Normal rate and regular rhythm  Pulses: no weak pulses          Dorsalis pedis pulses are 2+ on the right side and 2+ on the left side  Pulmonary:      Effort: Pulmonary effort is normal       Breath sounds: Normal breath sounds  Abdominal:      General: Abdomen is flat  Bowel sounds are normal       Palpations: Abdomen is soft  Feet:      Right foot:      Skin integrity: No ulcer, skin breakdown, erythema, warmth, callus or dry skin  Left foot:      Skin integrity: No ulcer, skin breakdown, erythema, warmth, callus or dry skin  Skin:     General: Skin is warm  Capillary Refill: Capillary refill takes less than 2 seconds  Neurological:      General: No focal deficit present  Mental Status: He is alert and oriented to person, place, and time  Patient's shoes and socks removed  Right Foot/Ankle   Right Foot Inspection  Skin Exam: skin normal and skin intact  No dry skin, no warmth, no callus, no erythema, no maceration, no abnormal color, no pre-ulcer, no ulcer and no callus  Toe Exam: ROM and strength within normal limits  Sensory   Vibration: intact  Monofilament testing: intact    Vascular  Capillary refills: < 3 seconds  The right DP pulse is 2+  Left Foot/Ankle  Left Foot Inspection  Skin Exam: skin normal and skin intact  No dry skin, no warmth, no erythema, no maceration, normal color, no pre-ulcer, no ulcer and no callus  Toe Exam: ROM and strength within normal limits  Sensory   Vibration: intact  Monofilament testing: intact    Vascular  Capillary refills: < 3 seconds  The left DP pulse is 2+       Assign Risk Category  No deformity present  No loss of protective sensation  No weak pulses  Risk: 0      Labs  Lab Results   Component Value Date    CREATININE 1 01 09/27/2022    BUN 14 09/27/2022    K 4 2 09/27/2022  09/27/2022    CO2 24 09/27/2022     eGFR   Date Value Ref Range Status   09/27/2022 91 ml/min/1 73sq m Final       Lab Results   Component Value Date    HDL 38 (L) 09/27/2022    TRIG 130 09/27/2022       Lab Results   Component Value Date    ALT 67 (H) 09/27/2022    AST 32 09/27/2022    ALKPHOS 73 09/27/2022      Latest Reference Range & Units 09/27/22 08:35   Hemoglobin A1C Normal 3 8-5 6%; PreDiabetic 5 7-6 4%; Diabetic >=6 5%; Glycemic control for adults with diabetes <7 0% % 8 9 (H)   eAG, EST AVG Glucose mg/dl 209   (H): Data is abnormally high     Latest Reference Range & Units 09/27/22 08:35   Potassium 3 5 - 5 3 mmol/L 4 2   Chloride 96 - 108 mmol/L 102   CO2 21 - 32 mmol/L 24   Anion Gap 4 - 13 mmol/L 10   BUN 5 - 25 mg/dL 14   Creatinine 0 60 - 1 30 mg/dL 1 01   GLUCOSE FASTING 65 - 99 mg/dL 177 (H)   Calcium 8 4 - 10 2 mg/dL 9 1   AST 13 - 39 U/L 32   ALT 7 - 52 U/L 67 (H)   Alkaline Phosphatase 34 - 104 U/L 73   Total Protein 6 4 - 8 4 g/dL 7 4   Albumin 3 5 - 5 0 g/dL 4 5   TOTAL BILIRUBIN 0 20 - 1 00 mg/dL 0 80   eGFR ml/min/1 73sq m 91   (H): Data is abnormally high   Latest Reference Range & Units 09/27/22 08:35   Vit D, 25-Hydroxy 30 0 - 100 0 ng/mL 40 2      Latest Reference Range & Units 09/27/22 08:35   Cholesterol See Comment mg/dL 128   Triglycerides See Comment mg/dL 130   HDL >=40 mg/dL 38 (L)   LDL Calculated 0 - 100 mg/dL 64   (L): Data is abnormally low  Impression:  1  Type 2 diabetes mellitus without complication, without long-term current use of insulin ([address] )       Plan:    Wilmar Mensah was seen today for diabetes type 2  Diagnoses and all orders for this visit:    Type 2 diabetes mellitus without complication, without long-term current use of insulin ([address] )  -     Ambulatory Referral to Endocrinology      1   Type 2 diabetes mellitus without complication, without long-term current use of insulin ([facility] [address] )  - Ambulatory Referral to Endocrinology    Patient with known T2DM diagnosed 7 years ago on 2 oral AHD without micro/macrovascular complications, htn, BMI >28 and Hepatic steatosis presents today to discuss his diabetic care  Although patient does have some metabolic syndrome with elevated BMI and hypertension and worsening of diabetes was d/t change in lifestyle habits last year all of which do indicate most likely has T2DM, I do believe can rule out DENVER given slow but steady progression of diabetes despite patient being someone who is otherwise overall healthy for the most part  Will check ALON and C-peptide to rule out DENVER  In the meantime, discussed other potential options to improve his diabetic control  Given issues with hypoglycemia in past with LANE use and given weight gain issues with it we will d/c this and instead switch him to Januvia 100mg daily, Will also increase metformin to 1000mg BID for max dose  Hopefully this should help improve his diabetes better  We will work to get him a CGM if insurance approves  If not ok to check BG every 2-3x a week for periodic reading, suggested to be checking for now since medication change made, if continuous to have BG >200 after this new change while being on Januvia for about a month, may consider switch to GLP-1 agonist (specially since has BMI >30 and Steato-hepatitis)  However since patient is highly motivated to lose weight and be more active right now will not jump to a more potent medication  Screening:- Does know need to see ophthalmology once a year- will follow up  Uptodate on lipid, LDL at goal, On statin therapy  Will order Urine microalbumin  RTC in 3 months for follow up    Discussed with the patient and all questioned fully answered  He will call me if any problems arise      Counseled patient on diagnostic results, prognosis, risk and benefit of treatment options, instruction for management, importance of treatment compliance, Risk  factor reduction and impressions      John Dumont MD

## 2022-10-20 ENCOUNTER — PROCEDURE VISIT (OUTPATIENT)
Dept: DERMATOLOGY | Facility: CLINIC | Age: 42
End: 2022-10-20

## 2022-10-20 VITALS — HEIGHT: 74 IN | TEMPERATURE: 97.5 F | BODY MASS INDEX: 32.08 KG/M2 | WEIGHT: 250 LBS

## 2022-10-20 DIAGNOSIS — L98.9 SKIN LESION: ICD-10-CM

## 2022-10-20 DIAGNOSIS — D48.9 NEOPLASM OF UNCERTAIN BEHAVIOR: Primary | ICD-10-CM

## 2022-10-20 NOTE — PATIENT INSTRUCTIONS
INFORMED CONSENT DISCUSSION AND POST-OPERATIVE INSTRUCTIONS FOR PATIENT    I   RATIONALE FOR PROCEDURE  I understand that a skin biopsy allows the Dermatologist to test a lesion or rash under the microscope to obtain a diagnosis  It usually involves numbing the area with numbing medication and removing a small piece of skin; sometimes the area will be closed with sutures  In this specific procedure, sutures are not usually needed  If any sutures are placed, then they are usually need to be removed in 2 weeks or less  I understand that my Dermatologist recommends that a skin "shave" biopsy be performed today  A local anesthetic, similar to the kind that a dentist uses when filling a cavity, will be injected with a very small needle into the skin area to be sampled  The injected skin and tissue underneath "will go to sleep” and become numb so no pain should be felt afterwards  An instrument shaped like a tiny "razor blade" (shave biopsy instrument) will be used to cut a small piece of tissue and skin from the area so that a sample of tissue can be taken and examined more closely under the microscope  A slight amount of bleeding will occur, but it will be stopped with direct pressure and a pressure bandage and any other appropriate methods  I understands that a scar will form where the wound was created  Surgical ointment will be applied to help protect the wound  Sutures are not usually needed      II   RISKS AND POTENTIAL COMPLICATIONS   I understand the risks and potential complications of a skin biopsy include but are not limited to the following:  Bleeding  Infection  Pain  Scar/keloid  Skin discoloration  Incomplete Removal  Recurrence  Nerve Damage/Numbness/Loss of Function  Allergic Reaction to Anesthesia  Biopsies are diagnostic procedures and based on findings additional treatment or evaluation may be required  Loss or destruction of specimen resulting in no additional findings    My Dermatologist has explained to me the nature of the condition, the nature of the procedure, and the benefits to be reasonably expected compared with alternative approaches  My Dermatologist has discussed the likelihood of major risks or complications of this procedure including the specific risks listed above, such as bleeding, infection, and scarring/keloid  I understand that a scar is expected after this procedure  I understand that my physician cannot predict if the scar will form a "keloid," which extends beyond the borders of the wound that is created  A keloid is a thick, painful, and bumpy scar  A keloid can be difficult to treat, as it does not always respond well to therapy, which includes injecting cortisone directly into the keloid every few weeks  While this usually reduces the pain and size of the scar, it does not eliminate it  I understand that photographs may be taken before and after the procedure  These will be maintained as part of the medical providers confidential records and may not be made available to me  I further authorize the medical provider to use the photographs for teaching purposes or to illustrate scientific papers, books, or lectures if in his/her judgment, medical research, education, or science may benefit from its use  I have had an opportunity to fully inquire about the risks and benefits of this procedure and its alternatives  I have been given ample time and opportunity to ask questions and to seek a second opinion if I wished to do so  I acknowledge that there have specifically been no guarantees as to the cosmetic results from the procedure  I am aware that with any procedure there is always the possibility of an unexpected complication  III  POST-PROCEDURAL CARE (WHAT YOU WILL NEED TO DO "AFTER THE BIOPSY" TO OPTIMIZE HEALING)    Keep the area clean and dry  Try NOT to remove the bandage or get it wet for the first 24 hours      Gently clean the area and apply surgical ointment (such as Vaseline petrolatum ointment, which is available "over the counter" and not a prescription) to the biopsy site for up to 2 weeks straight  This acts to protect the wound from the outside world  Sutures are not usually placed in this procedure  If any sutures were placed, return for suture removal as instructed (generally 1 week for the face, 2 weeks for the body)  Take Acetaminophen (Tylenol) for discomfort, if no contraindications  Ibuprofen or aspirin could make bleeding worse  Call our office immediately for signs of infection: fever, chills, increased redness, warmth, tenderness, discomfort/pain, or pus or foul smell coming from the wound  WHAT TO DO IF THERE IS ANY BLEEDING? If a small amount of bleeding is noticed, place a clean cloth over the area and apply firm pressure for ten minutes  Check the wound after 10 minutes of direct pressure  If bleeding persists, try one more time for an additional 10 minutes of direct pressure on the area  If the bleeding becomes heavier or does not stop after the second attempt, or if you have any other questions about this procedure, then please call your [facility] D[facility] by calling [phone] (SKIN)

## 2022-10-20 NOTE — PROGRESS NOTES
[facility] Note     Patient Name: Iveth Saeed  Encounter Date: 10/20/2022    • Have you been cared for by a [facility] in the last 3 years and, if so, which one? No    · Have you traveled outside of the [address] [place] [address] in the past 3 months or outside of the [facility] area in the last 2 weeks? No    • May we call your Preferred Phone number to discuss your specific medical information? Yes    • May we leave a detailed message that includes your specific medical information? Yes      Today's Chief Concerns:  • Concern #1:  Spot of concern     Past Medical History:  Have you personally ever had or currently have any of the following? · Skin cancer (such as Melanoma, Basal Cell Carcinoma, Squamous Cell Carcinoma? (If Yes, please provide more detail)- No  · Eczema: No  · Psoriasis: No  · HIV/AIDS: No  · Hepatitis B or C: No  · Tuberculosis: No  · Systemic Immunosuppression such as Diabetes, Biologic or Immunotherapy, Chemotherapy, Organ Transplantation, Bone Marrow Transplantation (If YES, please provide more detail): YES, Type 2 diabetes  · Radiation Treatment (If YES, please provide more detail): No  · Any other major medical conditions/concerns? (If Yes, which types)- No    Family History:  Have any of your "first degree relatives" (parent, brother, sister, or child) had any of the following       · Skin cancer such as Melanoma or Merkel Cell Carcinoma or Pancreatic Cancer? No  · Eczema, Asthma, Hay Fever or Seasonal Allergies: No  · Psoriasis or Psoriatic Arthritis: YES, Mom  · Do any other medical conditions seem to run in your family? If Yes, what condition and which relatives?   No    Current Medications:       Current Outpatient Medications:   •  Continuous Blood Gluc Sensor (FreeStyle Cassie 2 Sensor) MISC, Use 1 each every 14 (fourteen) days, Disp: 6 each, Rfl: 1  •  lisinopril (ZESTRIL) 10 mg tablet, Take 1 tablet (10 mg total) by mouth daily, Disp: 90 tablet, Rfl: 0  •  metFORMIN (GLUCOPHAGE) 1000 MG tablet, Take 1 tablet (1,000 mg total) by mouth 2 (two) times a day with meals, Disp: 100 tablet, Rfl: 1  •  simvastatin (ZOCOR) 20 mg tablet, Take 1 tablet (20 mg total) by mouth daily at bedtime, Disp: 30 tablet, Rfl: 0  •  sitaGLIPtin (JANUVIA) 100 mg tablet, Take 1 tablet (100 mg total) by mouth daily, Disp: 90 tablet, Rfl: 1      Review of Systems:  Have you recently had or currently have any of the following? If YES, what are you doing for the problem? · Fever, chills or unintended weight loss: No  · Sudden loss or change in your vision: No  · Nausea, vomiting or blood in your stool: No  · Painful or swollen joints: No  · Wheezing or cough: No  · Changing mole or non-healing wound: No  · Nosebleeds: No  · Excessive sweating: No  · Easy or prolonged bleeding? No  · Over the last 2 weeks, how often have you been bothered by the following problems? · Taking little interest or pleasure in doing things: 1 - Not at All  · Feeling down, depressed, or hopeless: 1 - Not at All  Rapid heartbeat with epinephrine:  No    · Any known allergies? No Known Allergies      Physical Exam:    • Was a chaperone (Derm Clinical Assistant) present throughout the entire Physical Exam? Yes    • Did the Dermatology Team specifically  the patient on the importance of a Full Skin Exam to be sure that nothing is missed clinically?  Yes}  o Did the patient ultimately request or accept a Full Skin Exam?  NO  o Did the patient specifically refuse to have the areas "under-the-bra" examined by the Dermatologist? No  o Did the patient specifically refuse to have the areas "under-the-underwear" examined by the Dermatologist? No    CONSTITUTIONAL:   Vitals:    10/20/22 0857   Temp: 97 5 °F (36 4 °C)   TempSrc: Temporal   Weight: 113 kg (250 lb)   Height: 6' 2" (1 88 m)       PSYCH: Normal mood and affect  EYES: Normal conjunctiva  ENT: Normal lips and oral mucosa  CARDIOVASCULAR: No edema  RESPIRATORY: Normal respirations  HEME/LYMPH/IMMUNO:  No regional lymphadenopathy except as noted below in "ASSESSMENT AND PLAN BY DIAGNOSIS"    SKIN:  FULL ORGAN SYSTEM EXAM  Hair, Scalp, Ears, Face Normal except as noted below in Assessment   Neck, Cervical Chain Nodes Normal except as noted below in Assessment   Right Arm/Hand/Fingers Normal except as noted below in Assessment   Left Arm/Hand/Fingers Normal except as noted below in Assessment   Chest/Breasts/Axillae Viewed areas Normal except as noted below in Assessment   Abdomen, Umbilicus Normal except as noted below in Assessment   Back/Spine Normal except as noted below in Assessment   Right Leg, Foot, Toes Normal except as noted below in Assessment   Left Leg, Foot, Toes Normal except as noted below in Assessment        Assessment and Plan by Diagnosis:    History of Present Condition:    • Duration:  How long has this been an issue for you?    o  3 years  • Location Affected:  Where on the body is this affecting you?    o  Abdomen  • Quality:  Is there any bleeding, pain, itch, burning/irritation, or redness associated with the skin lesion?    o  Denies  • Severity:  Describe any bleeding, pain, itch, burning/irritation, or redness on a scale of 1 to 10 (with 10 being the worst)  o  0  • Timing:  Does this condition seem to be there pretty constantly or do you notice it more at specific times throughout the day?    o  Constant  • Context:  Have you ever noticed that this condition seems to be associated with specific activities you do?    o  Denies  • Modifying Factors:    o Anything that seems to make the condition worse?    -  Denies  o What have you tried to do to make the condition better? -  Denies  • Associated Signs and Symptoms:  Does this skin lesion seem to be associated with any of the following:  o  SL AMB DERM SIGNS AND SYMPTOMS: Skin color changes         1   NEOPLASM OF UNCERTAIN BEHAVIOR OF SKIN    Physical Exam:  • (Anatomic Location); (Size and Morphological Description); (Differential Diagnosis):  o Specimen A: Abdomen; 1 1 cm x 0 8 cm irregular pigmented macule; Differential atypical nevus vs melanoma    Assessment and Plan:  • I have discussed with the patient that a sample of skin via a "skin biopsy” would be potentially helpful to further make a specific diagnosis under the microscope  • Based on a thorough discussion of this condition and the management approach to it (including a comprehensive discussion of the known risks, side effects and potential benefits of treatment), the patient (family) agrees to implement the following specific plan:    o Procedure:  Skin Biopsy  After a thorough discussion of treatment options and risk/benefits/alternatives (including but not limited to local pain, scarring, dyspigmentation, blistering, possible superinfection, and inability to confirm a diagnosis via histopathology), verbal and written consent were obtained and portion of the rash was biopsied for tissue sample  See below for consent that was obtained from patient and subsequent Procedure Note  PROCEDURE TANGENTIAL (SHAVE) BIOPSY NOTE:    • Performing Physician: Marimar Woodruff  • Anatomic Location; Clinical Description with size (cm); Pre-Op Diagnosis:     o Specimen A: Abdomen; 1 1 cm x 0 8 cm irregular pigmented macule; Differential atypical nevus vs melanoma    • Post-op diagnosis: Same     • Local anesthesia: 2% Lidocaine  HCL     • Topical anesthesia: None    • Hemostasis: Aluminum chloride       After obtaining informed consent  at which time there was a discussion about the purpose of biopsy  and low risks of infection and bleeding  The area was prepped and draped in the usual fashion  Anesthesia was obtained with 1% lidocaine with epinephrine   A shave biopsy to an appropriate sampling depth was obtained by Shave (Dermablade or 15 blade) The resulting wound was covered with surgical ointment and bandaged appropriately  The patient tolerated the procedure well without complications and was without signs of functional compromise  Specimen has been sent for review by Dermatopathology  Standard post-procedure care has been explained and has been included in written form within the patient's copy of Informed Consent  INFORMED CONSENT DISCUSSION AND POST-OPERATIVE INSTRUCTIONS FOR PATIENT    I   RATIONALE FOR PROCEDURE  I understand that a skin biopsy allows the Dermatologist to test a lesion or rash under the microscope to obtain a diagnosis  It usually involves numbing the area with numbing medication and removing a small piece of skin; sometimes the area will be closed with sutures  In this specific procedure, sutures are not usually needed  If any sutures are placed, then they are usually need to be removed in 2 weeks or less  I understand that my Dermatologist recommends that a skin "shave" biopsy be performed today  A local anesthetic, similar to the kind that a dentist uses when filling a cavity, will be injected with a very small needle into the skin area to be sampled  The injected skin and tissue underneath "will go to sleep” and become numb so no pain should be felt afterwards  An instrument shaped like a tiny "razor blade" (shave biopsy instrument) will be used to cut a small piece of tissue and skin from the area so that a sample of tissue can be taken and examined more closely under the microscope  A slight amount of bleeding will occur, but it will be stopped with direct pressure and a pressure bandage and any other appropriate methods  I understands that a scar will form where the wound was created  Surgical ointment will be applied to help protect the wound  Sutures are not usually needed      II   RISKS AND POTENTIAL COMPLICATIONS   I understand the risks and potential complications of a skin biopsy include but are not limited to the following:  • Bleeding  • Infection  • Pain  • Scar/keloid  • Skin discoloration  • Incomplete Removal  • Recurrence  • Nerve Damage/Numbness/Loss of Function  • Allergic Reaction to Anesthesia  • Biopsies are diagnostic procedures and based on findings additional treatment or evaluation may be required  • Loss or destruction of specimen resulting in no additional findings    My Dermatologist has explained to me the nature of the condition, the nature of the procedure, and the benefits to be reasonably expected compared with alternative approaches  My Dermatologist has discussed the likelihood of major risks or complications of this procedure including the specific risks listed above, such as bleeding, infection, and scarring/keloid  I understand that a scar is expected after this procedure  I understand that my physician cannot predict if the scar will form a "keloid," which extends beyond the borders of the wound that is created  A keloid is a thick, painful, and bumpy scar  A keloid can be difficult to treat, as it does not always respond well to therapy, which includes injecting cortisone directly into the keloid every few weeks  While this usually reduces the pain and size of the scar, it does not eliminate it  I understand that photographs may be taken before and after the procedure  These will be maintained as part of the medical providers confidential records and may not be made available to me  I further authorize the medical provider to use the photographs for teaching purposes or to illustrate scientific papers, books, or lectures if in his/her judgment, medical research, education, or science may benefit from its use  I have had an opportunity to fully inquire about the risks and benefits of this procedure and its alternatives  I have been given ample time and opportunity to ask questions and to seek a second opinion if I wished to do so    I acknowledge that there have specifically been no guarantees as to the cosmetic results from the procedure  I am aware that with any procedure there is always the possibility of an unexpected complication  III  POST-PROCEDURAL CARE (WHAT YOU WILL NEED TO DO "AFTER THE BIOPSY" TO OPTIMIZE HEALING)    • Keep the area clean and dry  Try NOT to remove the bandage or get it wet for the first 24 hours  • Gently clean the area and apply surgical ointment (such as Vaseline petrolatum ointment, which is available "over the counter" and not a prescription) to the biopsy site for up to 2 weeks straight  This acts to protect the wound from the outside world  • Sutures are not usually placed in this procedure  If any sutures were placed, return for suture removal as instructed (generally 1 week for the face, 2 weeks for the body)  • Take Acetaminophen (Tylenol) for discomfort, if no contraindications  Ibuprofen or aspirin could make bleeding worse  • Call our office immediately for signs of infection: fever, chills, increased redness, warmth, tenderness, discomfort/pain, or pus or foul smell coming from the wound  WHAT TO DO IF THERE IS ANY BLEEDING? If a small amount of bleeding is noticed, place a clean cloth over the area and apply firm pressure for ten minutes  Check the wound after 10 minutes of direct pressure  If bleeding persists, try one more time for an additional 10 minutes of direct pressure on the area  If the bleeding becomes heavier or does not stop after the second attempt, or if you have any other questions about this procedure, then please call your [facility] Dermatologist by calling [phone] (SKIN)  I hereby acknowledge that I have reviewed and verified the site with my Dermatologist and have requested and authorized my Dermatologist to proceed with the procedure      Scribe Attestation    I,:  Harini Kerns am acting as a scribe while in the presence of the attending physician :       I,:  Reed Albert MD personally performed the services described in this documentation    as scribed in my presence :

## 2022-11-26 PROBLEM — Z00.00 WELL ADULT EXAM: Status: RESOLVED | Noted: 2022-09-27 | Resolved: 2022-11-26

## 2023-01-03 DIAGNOSIS — I10 PRIMARY HYPERTENSION: ICD-10-CM

## 2023-01-03 RX ORDER — LISINOPRIL 10 MG/1
TABLET ORAL
Qty: 30 TABLET | Refills: 2 | Status: SHIPPED | OUTPATIENT
Start: 2023-01-03

## 2023-01-17 DIAGNOSIS — I10 PRIMARY HYPERTENSION: ICD-10-CM

## 2023-01-17 RX ORDER — LISINOPRIL 10 MG/1
TABLET ORAL
Qty: 90 TABLET | Refills: 1 | Status: SHIPPED | OUTPATIENT
Start: 2023-01-17

## 2023-01-27 ENCOUNTER — OFFICE VISIT (OUTPATIENT)
Dept: ENDOCRINOLOGY | Facility: HOSPITAL | Age: 43
End: 2023-01-27

## 2023-01-27 VITALS
BODY MASS INDEX: 31.18 KG/M2 | DIASTOLIC BLOOD PRESSURE: 78 MMHG | WEIGHT: 243 LBS | SYSTOLIC BLOOD PRESSURE: 118 MMHG | HEIGHT: 74 IN | HEART RATE: 101 BPM

## 2023-01-27 DIAGNOSIS — E11.9 TYPE 2 DIABETES MELLITUS WITHOUT COMPLICATION, WITHOUT LONG-TERM CURRENT USE OF INSULIN (HCC): Primary | ICD-10-CM

## 2023-01-27 LAB
ALBUMIN/CREAT UR: <6 MG/G CREAT (ref 0–29)
C PEPTIDE SERPL-MCNC: 3.2 NG/ML (ref 1.1–4.4)
CREAT UR-MCNC: 48.5 MG/DL
GAD65 AB SER-ACNC: <5 U/ML (ref 0–5)
GLUCOSE P FAST SERPL-MCNC: 231 MG/DL (ref 70–99)
MICROALBUMIN UR-MCNC: <3 UG/ML
SL AMB POCT HEMOGLOBIN AIC: 11.1 (ref ?–6.5)

## 2023-01-27 RX ORDER — METFORMIN HYDROCHLORIDE 500 MG/1
TABLET, EXTENDED RELEASE ORAL
COMMUNITY
Start: 2023-01-03 | End: 2023-02-06 | Stop reason: SDUPTHER

## 2023-01-27 RX ORDER — FLASH GLUCOSE SCANNING READER
EACH MISCELLANEOUS
COMMUNITY
Start: 2022-10-05

## 2023-01-27 NOTE — PROGRESS NOTES
Established Patient Progress Note       Chief Complaint   Patient presents with   • Diabetes Type 2        History of Present Illness:     Cristina Narayanan is a 43 y o  male with a history of type 2 diabetes 7-8 years ago without any micro/macrovascular complication, newly diagnosed hypertension, BMI >30, NAFLD presents today for diabetic care  Initial visit 10/22  Currently he denies any polyuria, polydipsia, nocturia and blurry vision  He denies nephropathy and retinopathy, neuropathy  Initial visit:- Patient reported good diabetic control initially with lifestyle changes with HbA1C 6-7% with metformin only  Decline in glycemic control in 2019 which lead to raise in HbA1C to 10 3%- started on LANE, Patient worked in lifestyle changes and additional medications- improved control down to 7% again  However had social issues and relaxed on diet, gained weight in 2021 which lead to raise in HbA1C upto 8 5-8 9%  Last visit:- Patient had reported wanting to improve on diet/exercise more  Did report ?low BG while on Glipizide 5mg and also reported having issues with weight gain  HbA1C then 10/22 was 8 9%, therefore recommend stopping LANE, increased metformin to 1000mg BID and also started on Januvia instead  Also given concern for need for constant up escalation of Tx (when while on good lifestyle choices in 2019)- suggested testing for ? [place] to look if has autoimmune diabetes or not  Also provided CGM script as patient wanted to use this BG data to help with lifestyle changes    He now comes for 3 months follow up  Patient reports he didn't increase his metformin dose as he wanted to work on 1 medication change at a time and also work on his diet and exercise routine and lose more weight to see if this help  Reports using the freestyle rikki for a bit to understand his BG pattern and improve on diet changes   He used the [employer] sensor initially and made lifestyle changes and was noticing good BG control and therefore stopped using them a while ago  Has 2 sensors at home if needed  He feels he has made major lifestyle changes, eats healthier, monitors carb intake and does calorie restriction and has lost weight as well  Blood Sugar/Glucometer/Pump/CGM review:  Doesn't check BG, used rikki for a bit  We do not have any BG data currently  Current serum BG on labs done showed fasting   Current regime:- Metformin 500mg BID (did not increase to 1000mg), Januvia 100mg daily   Medications tried/failed in past:- Glipizide (d/c d/t hypoglycemia)  Hypoglycemic episodes: None  Hyperglycemia- denies any polyuria, polydipsia- drinks water at baseline but not overtly thirsty, urinates 1-2x a night  Denies any blurry vision, numbness/tingling to hands/feet  Diet- Has worked on diet changes, cut out all of his drinking alcohol, one weekend a month only  Tracking calorie My-fitness pal- kept calorie 1700 a day and kept at this  Eating more fruits and vegetables in diet  Do have some cheat days but not often  Activity- Tries to go 2 days a week to gym, does palateon biking and body weight workout  Weight-  Tracks his weight daily 258 in sept 2022  Currently home 237lbs  last eye exam - Hasn't seen eye doctor yet  Forgot about it  Will follow up   last foot exam - 10/22 in clinic   Does not see Podiatry  History of hypertension- Yes, is On lisinopril 10 mg  History of hyperlipidemia- Yes on 20mg simvastatin  Last lipid- 09/22, LDL 64, on station  Last Urine microalbumin- <6, 01/14/23    Other testing-   01/23  C peptide 3 2 with   ALON-65- pending     Patient Active Problem List   Diagnosis   • Skin lesion   • Type 2 diabetes mellitus without complication, without long-term current use of insulin (HCC)   • Chronic right shoulder pain   • History of colon polyps   • Class 1 obesity due to excess calories with serious comorbidity and body mass index (BMI) of 32 0 to 32 9 in adult   • Fatty liver   • Primary hypertension   • Thoracic aortic ectasia (HCC)      Past Medical History:   Diagnosis Date   • Diabetes mellitus ([address] )       Past Surgical History:   Procedure Laterality Date   • EYE SURGERY  2012    LASIK   • TONSILLECTOMY Bilateral 2002      Family History   Problem Relation Age of Onset   • Hypertension Mother    • Hyperlipidemia Mother    • Diabetes Mother    • Diabetes type II Mother    • Hypertension Father    • Diabetes Father      Social History     Tobacco Use   • Smoking status: Never   • Smokeless tobacco: Never   Substance Use Topics   • Alcohol use: Yes     Alcohol/week: 4 0 standard drinks     Types: 4 Standard drinks or equivalent per week     No Known Allergies    Current Outpatient Medications:   •  Continuous Blood Gluc  (FreeStyle Cassie 14 Day [place]) [facility], USE 1 SENSOR EVERY 14 DAYS, Disp: , Rfl:   •  semaglutide, 0 25 or 0 5 mg/dose, (Ozempic) 2 mg/1 5 mL injection pen, Inject 0 19 mL (0 25 mg total) under the skin every 7 days 0 25mg weekly for 2 weeks and then increase to 0 5mg weekly for 1 month, Disp: 1 5 mL, Rfl: 0  •  [START ON 3/10/2023] semaglutide, 1 mg/dose, (Ozempic, 1 MG/DOSE,) 4 mg/3 mL injection pen, Inject 0 75 mL (1 mg total) under the skin every 7 days Do not start before March 10, 2023 , Disp: 6 mL, Rfl: 1  •  Continuous Blood Gluc Sensor (FreeStyle Cassie 2 Sensor) MISC, Use 1 each every 14 (fourteen) days, Disp: 6 each, Rfl: 1  •  lisinopril (ZESTRIL) 10 mg tablet, TAKE 1 TABLET BY MOUTH EVERY DAY, Disp: 90 tablet, Rfl: 1  •  metFORMIN (GLUCOPHAGE-XR) 500 mg 24 hr tablet, TAKE 2 TABLETS BY MOUTH ONCE EVERY DAY FOR 90 DAYS, Disp: , Rfl:   •  simvastatin (ZOCOR) 20 mg tablet, Take 1 tablet (20 mg total) by mouth daily at bedtime, Disp: 30 tablet, Rfl: 0    Review of Systems   Constitutional: Negative for activity change, appetite change, fatigue and unexpected weight change  Respiratory: Negative for shortness of breath      Cardiovascular: Negative for palpitations and leg swelling  Gastrointestinal: Negative for abdominal pain, diarrhea and nausea  Endocrine: Positive for polydipsia  Negative for polyphagia and polyuria  Musculoskeletal: Negative for arthralgias and myalgias  Skin: Negative for color change  Neurological: Negative for dizziness, tremors, weakness and light-headedness  Physical Exam:  Body mass index is 31 2 kg/m²  /78   Pulse 101   Ht 6' 2" (1 88 m)   Wt 110 kg (243 lb)   BMI 31 20 kg/m²    Wt Readings from Last 3 Encounters:   01/27/23 110 kg (243 lb)   10/20/22 113 kg (250 lb)   10/05/22 117 kg (259 lb)     Physical Exam  Constitutional:       Appearance: Normal appearance  He is obese  Comments: Although overweight but appears fit and healthy- more muscular habitus   Eyes:      Pupils: Pupils are equal, round, and reactive to light  Cardiovascular:      Rate and Rhythm: Normal rate and regular rhythm  Pulses:           Dorsalis pedis pulses are 2+ on the right side and 2+ on the left side  Pulmonary:      Effort: Pulmonary effort is normal       Breath sounds: Normal breath sounds  Abdominal:      General: Abdomen is flat  Bowel sounds are normal       Palpations: Abdomen is soft  Feet:      Right foot:      Skin integrity: No ulcer, skin breakdown, erythema, warmth, callus or dry skin  Left foot:      Skin integrity: No ulcer, skin breakdown, erythema, warmth, callus or dry skin  Skin:     General: Skin is warm  Capillary Refill: Capillary refill takes less than 2 seconds  Neurological:      General: No focal deficit present  Mental Status: He is alert and oriented to person, place, and time     Psychiatric:         Mood and Affect: Mood normal          Labs:      Latest Reference Range & Units 09/27/22 08:35   Sodium 135 - 147 mmol/L 136   Potassium 3 5 - 5 3 mmol/L 4 2   Chloride 96 - 108 mmol/L 102   CO2 21 - 32 mmol/L 24   Anion Gap 4 - 13 mmol/L 10   BUN 5 - 25 mg/dL 14 Creatinine 0 60 - 1 30 mg/dL 1 01   GLUCOSE FASTING 65 - 99 mg/dL 177 (H)   Calcium 8 4 - 10 2 mg/dL 9 1   AST 13 - 39 U/L 32   ALT 7 - 52 U/L 67 (H)   Alkaline Phosphatase 34 - 104 U/L 73   Total Protein 6 4 - 8 4 g/dL 7 4   Albumin 3 5 - 5 0 g/dL 4 5   TOTAL BILIRUBIN 0 20 - 1 00 mg/dL 0 80   eGFR ml/min/1 73sq m 91   HIGH SENSITIVITY C-REACTIVE PROTEIN mg/L 1 88   Cholesterol See Comment mg/dL 128   Triglycerides See Comment mg/dL 130   HDL >=40 mg/dL 38 (L)   LDL Calculated 0 - 100 mg/dL 64   Vit D, 25-Hydroxy 30 0 - 100 0 ng/mL 40 2   (H): Data is abnormally high  (L): Data is abnormally low     Latest Reference Range & Units 09/27/22 08:35   Hemoglobin A1C Normal 3 8-5 6%; PreDiabetic 5 7-6 4%; Diabetic >=6 5%; Glycemic control for adults with diabetes <7 0% % 8 9 (H)   eAG, EST AVG Glucose mg/dl 209   (H): Data is abnormally high    POC HbA1C    Latest Reference Range & Units 01/27/23 12:53   Hemoglobin A1C 6 5  11 1 !   !: Data is abnormal    Impression & Plan:    Problem List Items Addressed This Visit        Endocrine    Type 2 diabetes mellitus without complication, without long-term current use of insulin (HCC) - Primary    Relevant Medications    metFORMIN (GLUCOPHAGE-XR) 500 mg 24 hr tablet    semaglutide, 0 25 or 0 5 mg/dose, (Ozempic) 2 mg/1 5 mL injection pen    semaglutide, 1 mg/dose, (Ozempic, 1 MG/DOSE,) 4 mg/3 mL injection pen (Start on 3/10/2023)    Other Relevant Orders    POCT hemoglobin A1c (Completed)    HEMOGLOBIN A1C W/ EAG ESTIMATION Lab Collect    HEMOGLOBIN A1C W/ EAG ESTIMATION Lab Collect       Orders Placed This Encounter   Procedures   • HEMOGLOBIN A1C W/ EAG ESTIMATION Lab Collect     Standing Status:   Future     Standing Expiration Date:   1/27/2024   • HEMOGLOBIN A1C W/ EAG ESTIMATION Lab Collect     Standing Status:   Future     Standing Expiration Date:   1/27/2024   • POCT hemoglobin A1c       There are no Patient Instructions on file for this visit       44yM with T2DM- dx 2015 on 2 oral AHD without micro/macrovascular complications, htn, BMI >87 and Hepatic steatosis presents today for his diabetic care  Last visit 10/22, presents today for his 3 month visit     1  T2DM:-     Patient reports improving on his diet and exercise, feels he is doing well with his carb intake and exercise  Feels could work more on diet  Has lost 20lbs since Sept 2022  No BG logs to review today  1 fasting BG on lab was high at 231 and patients currently HbA1C has drastically increased from 8 9% 5 months ago to 11% now  Patient does not have any symptoms of overt hyperglycemia  C peptide level although normal at 3 2 is slightly low for BG of 231  Still a concern for possible [place], ALON pending  Reviewed with patient the concern for progressive diabetes  No illness, recent stressors or steroid use to explain decline in glycemic control  Reviewed that if ALON positive may need to consider possible basal insulin in future based on glycemic control  Plan  - Start GLP-1 agonist to help with glycemic control and also promote weight loss  Take Ozempic 0 25 mg per week for two weeks then take 0 5 mg for four weeks after which take 1 mg per week  - Increase metformin to 1000mg BID  - D/c Januvia  - ?consider SGLT-2 antagonist in future  - Start using his freestyle Cassie and send BG data in 2 weeks for review  - Repeat HbA1C now- serum to confirm hyperglycemia since patient unsure if POC was accurate, and also placed orders for next visit     Screening:- Does know need to see ophthalmology once a year- will follow up  Uptodate on lipid, LDL at goal, On statin therapy, repeat 09/23  Uptodate with Urine microalbumin- repeat 01/24    2  Hypertension - BP in office today, 118/78, at goal <140/90  No change in medication  C/w lisinopril 10 mg    3  Hyperlipidemia:- LDL at goal  C/w simvastatin 20mg daily     RTC in 6 weeks for follow up    Discussed with the patient and all questioned fully answered   He will call me if any problems arise      Counseled patient on diagnostic results, prognosis, risk and benefit of treatment options, instruction for management, importance of treatment compliance, Risk  factor reduction and impressions      Beverly Moreland MD

## 2023-02-06 DIAGNOSIS — E11.9 TYPE 2 DIABETES MELLITUS WITHOUT COMPLICATION, WITHOUT LONG-TERM CURRENT USE OF INSULIN (HCC): Primary | ICD-10-CM

## 2023-02-06 RX ORDER — METFORMIN HYDROCHLORIDE 500 MG/1
1000 TABLET, EXTENDED RELEASE ORAL 2 TIMES DAILY WITH MEALS
Qty: 180 TABLET | Refills: 1 | Status: SHIPPED | OUTPATIENT
Start: 2023-02-06

## 2023-03-13 DIAGNOSIS — E11.9 TYPE 2 DIABETES MELLITUS WITHOUT COMPLICATION, WITHOUT LONG-TERM CURRENT USE OF INSULIN (HCC): ICD-10-CM

## 2023-03-13 RX ORDER — METFORMIN HYDROCHLORIDE 500 MG/1
TABLET, EXTENDED RELEASE ORAL
Qty: 360 TABLET | Refills: 1 | Status: SHIPPED | OUTPATIENT
Start: 2023-03-13

## 2023-03-28 LAB
EST. AVERAGE GLUCOSE BLD GHB EST-MCNC: 203 MG/DL
HBA1C MFR BLD: 8.7 % (ref 4.8–5.6)

## 2023-03-30 NOTE — PROGRESS NOTES
Established Patient Progress Note       Chief Complaint   Patient presents with   • Diabetes      History of Present Illness:   Amari Anthony is a 43 y o  male with a history of type 2 diabetes 7-8 years ago without any micro/macrovascular complication, newly diagnosed hypertension, BMI >30, NAFLD presents today for diabetic care  Initial visit 10/22  Last visit 01/23  Currently he denies any polyuria, polydipsia, nocturia and blurry vision  He denies nephropathy and retinopathy, neuropathy  Initial visit:- Patient reported good diabetic control initially with lifestyle changes with HbA1C 6-7% with metformin only  Decline in glycemic control in 2019 which lead to raise in HbA1C to 10 3%- started on LANE, Patient worked in lifestyle changes and additional medications- improved control down to 7% again  However had social issues and relaxed on diet, gained weight in 2021 which lead to raise in HbA1C upto 8 5-8 9%  Initial visit optimized on metformin and also started on januvia- patient did not increase metformin and despite addition of januvia HbA1C increased to 11% last visit  Started on ozempic instead and increase metformin  Since last visit, patient reports he started to see improved BG since increasing his metformin dose  Patient also started ozempic and tolerated it well  Some nausea but now resolved  Feels appetite is also low now and feels good about this  Does not use rikki often since has to pay Out of pocket but checks BG fasting mostly daily  BG in 110-150 range fasting   Feels his BG have been greatly improving in the last month since starting Ozempic 1mg weekly     Blood Sugar/Glucometer/Pump/CGM review:   Fasting -115    Current regime:- Metformin 1000mg BID, Ozempic 1mg weekly  Medications tried/failed in past:- Glipizide (d/c d/t hypoglycemia)  Hypoglycemic episodes: None  Hyperglycemia- denies any polyuria, polydipsia, Denies any blurry vision, numbness/tingling to hands/feet  Diet- Has worked on diet changes, cut out all of his drinking alcohol, one weekend a month only  Tracking calorie My-fitness pal- kept calorie 1700 a day and kept at this  Eating more fruits and vegetables in diet  Activity- Tries to go 2 days a week to gym, does [employer] biking and body weight workout  Weight-  Tracks his weight daily 258 in sept 2022  Currently home 231 8lbs     last eye exam - Hasn't seen eye doctor yet  Forgot about it  Will follow up- has names with him  last foot exam - 10/22 in clinic  History of hypertension- Yes, is On lisinopril 10 mg  History of hyperlipidemia- Yes on 20mg simvastatin  Last lipid- 09/22, LDL 64, on station  Last Urine microalbumin- <6, 01/14/23    Other testing-   01/23  C peptide 3 2 with   ALON-65- pending     Patient Active Problem List   Diagnosis   • Skin lesion   • Type 2 diabetes mellitus without complication, without long-term current use of insulin (HCC)   • Chronic right shoulder pain   • History of colon polyps   • Class 1 obesity due to excess calories with serious comorbidity and body mass index (BMI) of 32 0 to 32 9 in adult   • Fatty liver   • Primary hypertension   • Thoracic aortic ectasia (HCC)      Past Medical History:   Diagnosis Date   • Diabetes mellitus ([facility] [address] )       Past Surgical History:   Procedure Laterality Date   • EYE SURGERY  2012    LASIK   • TONSILLECTOMY Bilateral 2002      Family History   Problem Relation Age of Onset   • Hypertension Mother    • Hyperlipidemia Mother    • Diabetes Mother    • Diabetes type II Mother    • Hypertension Father    • Diabetes Father      Social History     Tobacco Use   • Smoking status: Never   • Smokeless tobacco: Never   Substance Use Topics   • Alcohol use:  Yes     Alcohol/week: 4 0 standard drinks     Types: 4 Standard drinks or equivalent per week     No Known Allergies    Current Outpatient Medications:   •  lisinopril (ZESTRIL) 10 mg tablet, TAKE 1 TABLET BY MOUTH EVERY DAY, "Disp: 90 tablet, Rfl: 1  •  metFORMIN (GLUCOPHAGE-XR) 500 mg 24 hr tablet, TAKE 2 TABLETS BY MOUTH TWICE A DAY WITH FOOD, Disp: 360 tablet, Rfl: 1  •  semaglutide, 1 mg/dose, (Ozempic, 1 MG/DOSE,) 4 mg/3 mL injection pen, Inject 0 75 mL (1 mg total) under the skin every 7 days Do not start before March 10, 2023 , Disp: 6 mL, Rfl: 1  •  simvastatin (ZOCOR) 20 mg tablet, Take 1 tablet (20 mg total) by mouth daily at bedtime, Disp: 30 tablet, Rfl: 0  •  Continuous Blood Gluc  (FreeStyle Cassie 14 Day [place]) JESSA, USE 1 SENSOR EVERY 14 DAYS, Disp: , Rfl:   •  Continuous Blood Gluc Sensor (FreeStyle Cassie 2 Sensor) MISC, Use 1 each every 14 (fourteen) days, Disp: 6 each, Rfl: 1    Review of Systems   Constitutional: Negative for activity change, appetite change, fatigue and unexpected weight change  Respiratory: Negative for shortness of breath  Cardiovascular: Negative for palpitations and leg swelling  Gastrointestinal: Negative for abdominal pain, diarrhea and nausea  Endocrine: Positive for polydipsia  Negative for polyphagia and polyuria  Musculoskeletal: Negative for arthralgias and myalgias  Skin: Negative for color change  Neurological: Negative for dizziness, tremors, weakness and light-headedness  Physical Exam:  Body mass index is 30 48 kg/m²  /70   Pulse 103   Ht 6' 2\" (1 88 m)   Wt 108 kg (237 lb 6 4 oz)   BMI 30 48 kg/m²    Wt Readings from Last 3 Encounters:   03/31/23 108 kg (237 lb 6 4 oz)   01/27/23 110 kg (243 lb)   10/20/22 113 kg (250 lb)     Physical Exam  Constitutional:       Appearance: Normal appearance  He is obese  Comments: Although overweight but appears fit and healthy- more muscular habitus   Eyes:      Pupils: Pupils are equal, round, and reactive to light  Cardiovascular:      Rate and Rhythm: Normal rate and regular rhythm  Pulses:           Dorsalis pedis pulses are 2+ on the right side and 2+ on the left side     Pulmonary:      " Effort: Pulmonary effort is normal       Breath sounds: Normal breath sounds  Abdominal:      General: Abdomen is flat  Bowel sounds are normal       Palpations: Abdomen is soft  Feet:      Right foot:      Skin integrity: No ulcer, skin breakdown, erythema, warmth, callus or dry skin  Left foot:      Skin integrity: No ulcer, skin breakdown, erythema, warmth, callus or dry skin  Skin:     General: Skin is warm  Capillary Refill: Capillary refill takes less than 2 seconds  Neurological:      General: No focal deficit present  Mental Status: He is alert and oriented to person, place, and time     Psychiatric:         Mood and Affect: Mood normal          Labs:      Latest Reference Range & Units 09/27/22 08:35   Sodium 135 - 147 mmol/L 136   Potassium 3 5 - 5 3 mmol/L 4 2   Chloride 96 - 108 mmol/L 102   CO2 21 - 32 mmol/L 24   Anion Gap 4 - 13 mmol/L 10   BUN 5 - 25 mg/dL 14   Creatinine 0 60 - 1 30 mg/dL 1 01   GLUCOSE FASTING 65 - 99 mg/dL 177 (H)   Calcium 8 4 - 10 2 mg/dL 9 1   AST 13 - 39 U/L 32   ALT 7 - 52 U/L 67 (H)   Alkaline Phosphatase 34 - 104 U/L 73   Total Protein 6 4 - 8 4 g/dL 7 4   Albumin 3 5 - 5 0 g/dL 4 5   TOTAL BILIRUBIN 0 20 - 1 00 mg/dL 0 80   eGFR ml/min/1 73sq m 91   HIGH SENSITIVITY C-REACTIVE PROTEIN mg/L 1 88   Cholesterol See Comment mg/dL 128   Triglycerides See Comment mg/dL 130   HDL >=40 mg/dL 38 (L)   LDL Calculated 0 - 100 mg/dL 64   Vit D, 25-Hydroxy 30 0 - 100 0 ng/mL 40 2   (H): Data is abnormally high  (L): Data is abnormally low     Latest Reference Range & Units 09/27/22 08:35 01/27/23 12:53 03/28/23 12:33   Hemoglobin A1C 4 8 - 5 6 % 8 9 (H) 11 1 ! 8 7 (H)   eAG, EST AVG Glucose mg/dL 209  203   (H): Data is abnormally high  !: Data is abnormal     Latest Reference Range & Units 01/24/23 12:24   C-PEPTIDE 1 1 - 4 4 ng/mL 3 2      Latest Reference Range & Units 01/24/23 12:24   Glutaminc Acid Decarboxylase 65 Ab 0 0 - 5 0 U/mL <5 0      Latest Reference Range & Units 01/24/23 12:24   EXT Creatinine Urine Not Estab  mg/dL 48 5   MICROALBUMIN/CREATININE RATIO 0 - 29 mg/g creat <6   MICROALBUM ,U,RANDOM Not Estab  ug/mL <3 0      Latest Reference Range & Units 09/27/22 08:35   Cholesterol See Comment mg/dL 128   Triglycerides See Comment mg/dL 130   HDL >=40 mg/dL 38 (L)   LDL Calculated 0 - 100 mg/dL 64   (L): Data is abnormally low    Impression & Plan:    Problem List Items Addressed This Visit        Endocrine    Type 2 diabetes mellitus without complication, without long-term current use of insulin ([facility] [address] ) - Primary       Cardiovascular and Mediastinum    Primary hypertension       Other    Class 1 obesity due to excess calories with serious comorbidity and body mass index (BMI) of 32 0 to 32 9 in adult       No orders of the defined types were placed in this encounter  There are no Patient Instructions on file for this visit  44yM with T2DM- dx 2015 on 2 oral AHD without micro/macrovascular complications, htn, BMI >44 and Hepatic steatosis presents today for his diabetic care  Initial visit 10/22, Last visit 01/23  1  T2DM:-   Given progression of diabetes to Hba1c of 11% DENVER screened and ruled out  Patient is following diet, exercise plan and also reports improved glycemic control since increasing metformin and starting ozempic  Although patients HbA1C is still above goal at 8 7% it has improved from 11% and with current BG in range as reported hoping HbA1C will continue to improve  Therefore will not suggest any changes to regime for now  Discussed if BG >200 please reach out to me sooner to increase ozempic  Plan  - c/w ozempic 1mg weekly and  metformin to 1000mg BID  - Check BG 3-4x weekly  - Repeat HbA1C in 3 months     Screening:- Does know need to see ophthalmology once a year- will follow up  Uptodate on lipid, LDL at goal, On statin therapy, repeat 09/23  Uptodate with Urine microalbumin- repeat 01/24    2   Hypertension - BP in office today, 110/70, at goal <140/90  No change in medication  C/w lisinopril 10 mg    3  Hyperlipidemia:- LDL at goal  C/w simvastatin 20mg daily     RTC in 3 months     Discussed with the patient and all questioned fully answered  He will call me if any problems arise      Counseled patient on diagnostic results, prognosis, risk and benefit of treatment options, instruction for management, importance of treatment compliance, Risk  factor reduction and impressions      Latia Borjas MD

## 2023-03-31 ENCOUNTER — OFFICE VISIT (OUTPATIENT)
Dept: ENDOCRINOLOGY | Facility: HOSPITAL | Age: 43
End: 2023-03-31

## 2023-03-31 VITALS
BODY MASS INDEX: 30.47 KG/M2 | SYSTOLIC BLOOD PRESSURE: 110 MMHG | HEART RATE: 103 BPM | WEIGHT: 237.4 LBS | HEIGHT: 74 IN | DIASTOLIC BLOOD PRESSURE: 70 MMHG

## 2023-03-31 DIAGNOSIS — E11.9 TYPE 2 DIABETES MELLITUS WITHOUT COMPLICATION, WITHOUT LONG-TERM CURRENT USE OF INSULIN (HCC): Primary | ICD-10-CM

## 2023-03-31 DIAGNOSIS — I10 PRIMARY HYPERTENSION: ICD-10-CM

## 2023-03-31 DIAGNOSIS — E66.09 CLASS 1 OBESITY DUE TO EXCESS CALORIES WITH SERIOUS COMORBIDITY AND BODY MASS INDEX (BMI) OF 32.0 TO 32.9 IN ADULT: ICD-10-CM

## 2023-07-10 LAB — HBA1C MFR BLD: 6 % (ref 4.8–5.6)

## 2023-07-14 ENCOUNTER — OFFICE VISIT (OUTPATIENT)
Dept: ENDOCRINOLOGY | Facility: HOSPITAL | Age: 43
End: 2023-07-14
Payer: COMMERCIAL

## 2023-07-14 VITALS
HEART RATE: 90 BPM | SYSTOLIC BLOOD PRESSURE: 122 MMHG | WEIGHT: 237 LBS | DIASTOLIC BLOOD PRESSURE: 80 MMHG | OXYGEN SATURATION: 98 % | BODY MASS INDEX: 30.42 KG/M2 | HEIGHT: 74 IN

## 2023-07-14 DIAGNOSIS — I10 PRIMARY HYPERTENSION: ICD-10-CM

## 2023-07-14 DIAGNOSIS — E11.9 TYPE 2 DIABETES MELLITUS WITHOUT COMPLICATION, WITHOUT LONG-TERM CURRENT USE OF INSULIN (HCC): Primary | ICD-10-CM

## 2023-07-14 DIAGNOSIS — E66.09 CLASS 1 OBESITY DUE TO EXCESS CALORIES WITH SERIOUS COMORBIDITY AND BODY MASS INDEX (BMI) OF 32.0 TO 32.9 IN ADULT: ICD-10-CM

## 2023-07-14 PROCEDURE — 95251 CONT GLUC MNTR ANALYSIS I&R: CPT | Performed by: STUDENT IN AN ORGANIZED HEALTH CARE EDUCATION/TRAINING PROGRAM

## 2023-07-14 PROCEDURE — 99214 OFFICE O/P EST MOD 30 MIN: CPT | Performed by: STUDENT IN AN ORGANIZED HEALTH CARE EDUCATION/TRAINING PROGRAM

## 2023-07-14 RX ORDER — METFORMIN HYDROCHLORIDE 500 MG/1
1000 TABLET, EXTENDED RELEASE ORAL
Qty: 200 TABLET | Refills: 1 | Status: SHIPPED | OUTPATIENT
Start: 2023-07-14

## 2023-07-14 NOTE — PROGRESS NOTES
Established Patient Progress Note       Chief Complaint   Patient presents with   • Diabetes Type 2      History of Present Illness:   Bhavani Awan is a 43 y.o. male with a history of type 2 diabetes 7-8 years ago without any micro/macrovascular complication, newly diagnosed hypertension, BMI >30, NAFLD presents today for diabetic care. Initial visit 10/22. Last visit 03/23. Patient recent went on cruise and reports enjoyed with food and drinking. Did forget his metformin on ship and insurance didn't provide more refills. Reports since stopping metformin not having any GI issues, reports tolerated metformin at 1000mg much better than higher dose. Has been noticing higher BG since stopping metformin. Blood Sugar/Glucometer/Pump/CGM review: Wears CGM PRN when needed  Bhavani Awan   Device used LANDBAY 2  CGM wear 50%    Indication   Type 2 Diabetes    More than 72 hours of data was reviewed. Report to be scanned to chart. Date Range: July 1- July 14 2023    Analysis of data:   Average Glucose: 144  GMI 6.8%  Time in Target Range: 84%  Time Above Range: 15%/1%  Time Below Range: 0%    Interpretation of data: BG mainly in range with some PPH and high BG variability with lunch and dinner    Current regime:- Metformin 1000mg daily (hasnt been taking it for few weeks), Ozempic 1mg weekly  Medications tried/failed in past:- Glipizide (d/c d/t hypoglycemia)  Hypoglycemic episodes: None  Hyperglycemia- denies any polyuria, polydipsia, Denies any blurry vision, numbness/tingling to hands/feet. Diet- is now getting back to eating healthier since being back from cruise  Activity- stays active and goes to gym  Weight-  Lost 5lbs since last visit     last eye exam - Hasn't seen eye doctor yet. Forgot about it. Will follow up- has names with him  last foot exam - 10/22 in clinic.   History of hypertension- Yes, is On lisinopril 10 mg  History of hyperlipidemia- Yes on 20mg simvastatin  Last lipid- 09/22, LDL 64, on statin  Last Urine microalbumin- <6, 01/14/23  Last HbA1C 07/23 6%    Other testing-   01/23  C peptide 3.2 with   ALON-65- pending     Patient Active Problem List   Diagnosis   • Skin lesion   • Type 2 diabetes mellitus without complication, without long-term current use of insulin (HCC)   • Chronic right shoulder pain   • History of colon polyps   • Class 1 obesity due to excess calories with serious comorbidity and body mass index (BMI) of 32.0 to 32.9 in adult   • Fatty liver   • Primary hypertension   • Thoracic aortic ectasia (HCC)      Past Medical History:   Diagnosis Date   • Diabetes mellitus ([address])       Past Surgical History:   Procedure Laterality Date   • EYE SURGERY  2012    LASIK   • TONSILLECTOMY Bilateral 2002      Family History   Problem Relation Age of Onset   • Hypertension Mother    • Hyperlipidemia Mother    • Diabetes Mother    • Diabetes type II Mother    • Hypertension Father    • Diabetes Father      Social History     Tobacco Use   • Smoking status: Never   • Smokeless tobacco: Never   Substance Use Topics   • Alcohol use:  Yes     Alcohol/week: 4.0 standard drinks of alcohol     Types: 4 Standard drinks or equivalent per week     No Known Allergies    Current Outpatient Medications:   •  Continuous Blood Gluc  (FreeStyle Cassie 14 Day [place]) [facility], USE 1 SENSOR EVERY 14 DAYS, Disp: , Rfl:   •  Continuous Blood Gluc Sensor (FreeStyle Cassie 2 Sensor) MISC, Use 1 each every 14 (fourteen) days, Disp: 6 each, Rfl: 1  •  lisinopril (ZESTRIL) 10 mg tablet, TAKE 1 TABLET BY MOUTH EVERY DAY, Disp: 90 tablet, Rfl: 1  •  semaglutide, 1 mg/dose, (Ozempic, 1 MG/DOSE,) 4 mg/3 mL injection pen, Inject 0.75 mL (1 mg total) under the skin every 7 days, Disp: 6 mL, Rfl: 1  •  simvastatin (ZOCOR) 20 mg tablet, Take 1 tablet (20 mg total) by mouth daily at bedtime, Disp: 30 tablet, Rfl: 0  •  metFORMIN (GLUCOPHAGE-XR) 500 mg 24 hr tablet, TAKE 2 TABLETS BY MOUTH TWICE A DAY WITH FOOD, Disp: 360 tablet, Rfl: 1    Review of Systems   Constitutional: Negative for activity change, appetite change, fatigue and unexpected weight change. Respiratory: Negative for shortness of breath. Cardiovascular: Negative for palpitations and leg swelling. Gastrointestinal: Negative for abdominal pain, diarrhea and nausea. Endocrine: negative for polydipsia. Negative for polyphagia and polyuria. Musculoskeletal: Negative for arthralgias and myalgias. Skin: Negative for color change. Neurological: Negative for dizziness, tremors, weakness and light-headedness. Physical Exam:  Body mass index is 30.43 kg/m². /80   Pulse 90   Ht 6' 2" (1.88 m)   Wt 108 kg (237 lb)   SpO2 98%   BMI 30.43 kg/m²    Wt Readings from Last 3 Encounters:   07/14/23 108 kg (237 lb)   03/31/23 108 kg (237 lb 6.4 oz)   01/27/23 110 kg (243 lb)     Physical Exam  Constitutional:       Appearance: Normal appearance. He is obese. Eyes:      Pupils: Pupils are equal, round, and reactive to light. Cardiovascular:      Rate and Rhythm: Normal rate and regular rhythm. Pulses:           Dorsalis pedis pulses are 2+ on the right side and 2+ on the left side. Pulmonary:      Effort: Pulmonary effort is normal.      Breath sounds: Normal breath sounds. Abdominal:      General: Abdomen is flat. Bowel sounds are normal.      Palpations: Abdomen is soft. Feet:      Right foot:      Skin integrity: No ulcer, skin breakdown, erythema, warmth, callus or dry skin. Left foot:      Skin integrity: No ulcer, skin breakdown, erythema, warmth, callus or dry skin. Skin:     General: Skin is warm. Capillary Refill: Capillary refill takes less than 2 seconds. Neurological:      General: No focal deficit present. Mental Status: He is alert and oriented to person, place, and time.    Psychiatric:         Mood and Affect: Mood normal.     Labs:        Latest Reference Range & Units 09/27/22 08:35 01/27/23 12:53 03/28/23 12:33 07/10/23 12:38   Hemoglobin A1C 4.8 - 5.6 % 8.9 (H) 11.1 ! 8.7 (H) 6.0 (H)   eAG, EST AVG Glucose mg/dL 209  203    (H): Data is abnormally high  !: Data is abnormal     Latest Reference Range & Units 01/24/23 12:24   C-PEPTIDE 1.1 - 4.4 ng/mL 3.2      Latest Reference Range & Units 01/24/23 12:24   Glutaminc Acid Decarboxylase 65 Ab 0.0 - 5.0 U/mL <5.0      Latest Reference Range & Units 01/24/23 12:24   EXT Creatinine Urine Not Estab. mg/dL 48.5   MICROALBUMIN/CREATININE RATIO 0 - 29 mg/g creat <6   MICROALBUM.,U,RANDOM Not Estab. ug/mL <3.0      Latest Reference Range & Units 09/27/22 08:35   Cholesterol See Comment mg/dL 128   Triglycerides See Comment mg/dL 130   HDL >=40 mg/dL 38 (L)   LDL Calculated 0 - 100 mg/dL 64   (L): Data is abnormally low    Impression & Plan:    Problem List Items Addressed This Visit        Endocrine    Type 2 diabetes mellitus without complication, without long-term current use of insulin (HCC) - Primary    Relevant Orders    Hemoglobin A1C    Comprehensive metabolic panel    Albumin / creatinine urine ratio    Lipid panel       Cardiovascular and Mediastinum    Primary hypertension    Relevant Orders    Hemoglobin A1C    Comprehensive metabolic panel    Albumin / creatinine urine ratio    Lipid panel       Other    Class 1 obesity due to excess calories with serious comorbidity and body mass index (BMI) of 32.0 to 32.9 in adult    Relevant Orders    Hemoglobin A1C    Comprehensive metabolic panel    Albumin / creatinine urine ratio    Lipid panel       Orders Placed This Encounter   Procedures   • Hemoglobin A1C     Standing Status:   Future     Standing Expiration Date:   7/14/2024   • Comprehensive metabolic panel     This is a patient instruction: Patient fasting for 8 hours or longer recommended.      Standing Status:   Future     Standing Expiration Date:   7/14/2024   • Albumin / creatinine urine ratio     Standing Status:   Future     Standing Expiration Date: 7/14/2024   • Lipid panel     This is a patient instruction: This test requires patient fasting for 10-12 hours or longer. Drinking of black coffee or black tea is acceptable. Standing Status:   Future     Standing Expiration Date:   7/14/2024       There are no Patient Instructions on file for this visit. 44yM with T2DM- dx 2015 on 2 oral AHD without micro/macrovascular complications, htn, BMI >94 and Hepatic steatosis presents today for his diabetic care. Initial visit 10/22, Last visit 03/23. 1. T2DM:-   Given progression of diabetes to Hba1c of 11% [place] screened and ruled out. Patient is following diet, exercise plan and since starting ozempic this diabetes controlled as drastically improved to HbA1C of 6% now. CGM data does show PPH with high variability with meals specially with lunch/dinner. Most likely now d/t not being on metformin, this has caused higher BG now in 200 range. Therefore recommend resuming metformin to 1000mg daily only to avoid GI issues, and since reducing metformin, will increase ozempic to 2mg weekly to c/w improved glycemic control and also help with further weight loss    Plan  - Inc ozempic 2mg weekly  - reduce metformin to 1000mg XR daily   - Repeat HbA1C in 3 months     Screening:- Does know need to see ophthalmology once a year- will follow up  Uptodate on lipid, LDL at goal, On statin therapy, repeat 09/23  Uptodate with Urine microalbumin- repeat 01/24    2. Hypertension - BP in office today, 122/80, at goal <140/90. No change in medication. C/w lisinopril 10 mg    3. Hyperlipidemia:- LDL at goal. C/w simvastatin 20mg daily     RTC in 3 months     Discussed with the patient and all questioned fully answered. He will call me if any problems arise.     Counseled patient on diagnostic results, prognosis, risk and benefit of treatment options, instruction for management, importance of treatment compliance, Risk  factor reduction and impressions      Farida Tomlinson MD

## 2023-07-17 DIAGNOSIS — I10 PRIMARY HYPERTENSION: ICD-10-CM

## 2023-07-17 RX ORDER — LISINOPRIL 10 MG/1
TABLET ORAL
Qty: 30 TABLET | Refills: 0 | Status: SHIPPED | OUTPATIENT
Start: 2023-07-17 | End: 2023-08-10

## 2023-07-17 NOTE — TELEPHONE ENCOUNTER
Call patient and notify him that I refilled his prescription for lisinopril for 30 days.   But I would recommend any additional refills that he get through his PCP

## 2023-08-01 DIAGNOSIS — E11.9 TYPE 2 DIABETES MELLITUS WITHOUT COMPLICATION, WITHOUT LONG-TERM CURRENT USE OF INSULIN (HCC): Primary | ICD-10-CM

## 2023-08-10 DIAGNOSIS — I10 PRIMARY HYPERTENSION: ICD-10-CM

## 2023-08-10 RX ORDER — LISINOPRIL 10 MG/1
TABLET ORAL
Qty: 90 TABLET | Refills: 1 | Status: SHIPPED | OUTPATIENT
Start: 2023-08-10

## 2023-08-24 ENCOUNTER — TELEPHONE (OUTPATIENT)
Dept: ENDOCRINOLOGY | Facility: HOSPITAL | Age: 43
End: 2023-08-24

## 2023-12-08 ENCOUNTER — OFFICE VISIT (OUTPATIENT)
Dept: ENDOCRINOLOGY | Facility: HOSPITAL | Age: 43
End: 2023-12-08
Payer: COMMERCIAL

## 2023-12-08 VITALS
HEIGHT: 74 IN | DIASTOLIC BLOOD PRESSURE: 78 MMHG | BODY MASS INDEX: 30.8 KG/M2 | OXYGEN SATURATION: 98 % | HEART RATE: 89 BPM | SYSTOLIC BLOOD PRESSURE: 122 MMHG | WEIGHT: 240 LBS

## 2023-12-08 DIAGNOSIS — E11.9 TYPE 2 DIABETES MELLITUS WITHOUT COMPLICATION, WITHOUT LONG-TERM CURRENT USE OF INSULIN (HCC): Primary | ICD-10-CM

## 2023-12-08 DIAGNOSIS — I10 PRIMARY HYPERTENSION: ICD-10-CM

## 2023-12-08 DIAGNOSIS — E66.09 CLASS 1 OBESITY DUE TO EXCESS CALORIES WITH SERIOUS COMORBIDITY AND BODY MASS INDEX (BMI) OF 32.0 TO 32.9 IN ADULT: ICD-10-CM

## 2023-12-08 PROCEDURE — 99214 OFFICE O/P EST MOD 30 MIN: CPT | Performed by: STUDENT IN AN ORGANIZED HEALTH CARE EDUCATION/TRAINING PROGRAM

## 2023-12-08 RX ORDER — SIMVASTATIN 20 MG
20 TABLET ORAL
Qty: 90 TABLET | Refills: 1 | Status: SHIPPED | OUTPATIENT
Start: 2023-12-08

## 2023-12-08 NOTE — PROGRESS NOTES
Established Patient Progress Note       Chief Complaint   Patient presents with    Diabetes Type 2      History of Present Illness:   Elida Liao is a 37 y.o. male with a history of type 2 diabetes 7-8 years ago without any micro/macrovascular complication, newly diagnosed hypertension, BMI >30, NAFLD presents today for diabetic care. Initial visit 10/22. Last visit 07/23. Blood Sugar/Glucometer/Pump/CGM review: Wears CGM PRN when needed as pays out of pocket  [address]    Indication   Type 2 Diabetes    More than 72 hours of data was reviewed. Report to be scanned to chart. Date Range: Nov 25- Dec 8 2023  CGM wear 59%     Analysis of data:   Average Glucose: 117  GMI 6.1%  Time in Target Range: 99% (84%)  Time Above Range: 1% (15%/1%)  Time Below Range: (0%)    Interpretation of data: BG in range   Does report went 1 month without ozempic d/t supply issues and noticed more rapid BG spikes during that period. Does feel ozempic helps keep his BG in control     Current regime:- Metformin 1000mg daily, Ozempic 2mg weekly  Medications tried/failed in past:- Glipizide (d/c d/t hypoglycemia)  Hypoglycemic episodes: None  Hyperglycemia- denies any polyuria, polydipsia, Denies any blurry vision, numbness/tingling to hands/feet. Diet- has been eating better in winter  Activity- stays active  Weight- although our scale shows weight gain, his home scale does show weight loss at 235 and would like to lose few more pounds    last eye exam - Hasn't seen eye doctor yet. Forgot about it. Will follow up- has names with him  last foot exam - 10/22 in clinic. History of hypertension- Yes, is On lisinopril 10 mg  History of hyperlipidemia- Yes on 20mg simvastatin  Last lipid- 09/22, LDL 64, on statin  Last Urine microalbumin- <6, 01/14/23  Last HbA1C 07/23 6%  Had labs done yesterday but not reported yet.      Other testing-   01/23  C peptide 3.2 with   ALON-65- pending     Patient Active Problem List   Diagnosis    Skin lesion    Type 2 diabetes mellitus without complication, without long-term current use of insulin (HCC)    Chronic right shoulder pain    History of colon polyps    Class 1 obesity due to excess calories with serious comorbidity and body mass index (BMI) of 32.0 to 32.9 in adult    Fatty liver    Primary hypertension    Thoracic aortic ectasia (HCC)      Past Medical History:   Diagnosis Date    Diabetes mellitus ([address])       Past Surgical History:   Procedure Laterality Date    EYE SURGERY  2012    LASIK    TONSILLECTOMY Bilateral 2002      Family History   Problem Relation Age of Onset    Hypertension Mother     Hyperlipidemia Mother     Diabetes Mother     Diabetes type II Mother     Hypertension Father     Diabetes Father      Social History     Tobacco Use    Smoking status: Never    Smokeless tobacco: Never   Substance Use Topics    Alcohol use: Yes     Alcohol/week: 4.0 standard drinks of alcohol     Types: 4 Standard drinks or equivalent per week     No Known Allergies    Current Outpatient Medications:     Continuous Blood Gluc  (FreeStyle Cassie 14 Day [place]) [facility], USE 1 SENSOR EVERY 14 DAYS, Disp: , Rfl:     Continuous Blood Gluc Sensor (FreeStyle Cassie 2 Sensor) MISC, Use 1 each every 14 (fourteen) days, Disp: 6 each, Rfl: 1    lisinopril (ZESTRIL) 10 mg tablet, TAKE 1 TABLET BY MOUTH EVERY DAY, Disp: 90 tablet, Rfl: 1    metFORMIN (GLUCOPHAGE-XR) 500 mg 24 hr tablet, Take 2 tablets (1,000 mg total) by mouth daily with dinner, Disp: 200 tablet, Rfl: 1    semaglutide, 1 mg/dose, (Ozempic, 1 MG/DOSE,) 4 mg/3 mL injection pen, Inject 1.5 mL (2 mg total) under the skin every 7 days, Disp: 12 mL, Rfl: 1    simvastatin (ZOCOR) 20 mg tablet, Take 1 tablet (20 mg total) by mouth daily at bedtime, Disp: 30 tablet, Rfl: 0    Review of Systems   Constitutional: Negative for activity change, appetite change, fatigue and unexpected weight change.    Respiratory: Negative for shortness of breath. Cardiovascular: Negative for palpitations and leg swelling. Gastrointestinal: Negative for abdominal pain, diarrhea and nausea. Endocrine: negative for polydipsia. Negative for polyphagia and polyuria. Musculoskeletal: Negative for arthralgias and myalgias. Skin: Negative for color change. Neurological: Negative for dizziness, tremors, weakness and light-headedness. Physical Exam:  There is no height or weight on file to calculate BMI. There were no vitals taken for this visit. Wt Readings from Last 3 Encounters:   07/14/23 108 kg (237 lb)   03/31/23 108 kg (237 lb 6.4 oz)   01/27/23 110 kg (243 lb)     Physical Exam  Constitutional:       Appearance: Normal appearance. He is obese. Eyes:      Pupils: Pupils are equal, round, and reactive to light. Cardiovascular:      Rate and Rhythm: Normal rate and regular rhythm. Pulses:           Dorsalis pedis pulses are 2+ on the right side and 2+ on the left side. Pulmonary:      Effort: Pulmonary effort is normal.      Breath sounds: Normal breath sounds. Abdominal:      General: Abdomen is flat. Bowel sounds are normal.      Palpations: Abdomen is soft. Feet:      Right foot:      Skin integrity: No ulcer, skin breakdown, erythema, warmth, callus or dry skin. Left foot:      Skin integrity: No ulcer, skin breakdown, erythema, warmth, callus or dry skin. Skin:     General: Skin is warm. Capillary Refill: Capillary refill takes less than 2 seconds. Neurological:      General: No focal deficit present. Mental Status: He is alert and oriented to person, place, and time.    Psychiatric:         Mood and Affect: Mood normal.     Labs:        Latest Reference Range & Units 09/27/22 08:35 01/27/23 12:53 03/28/23 12:33 07/10/23 12:38   Hemoglobin A1C 4.8 - 5.6 % 8.9 (H) 11.1 ! 8.7 (H) 6.0 (H)   eAG, EST AVG Glucose mg/dL 209  203    (H): Data is abnormally high  !: Data is abnormal     Latest Reference Range & Units 01/24/23 12:24   C-PEPTIDE 1.1 - 4.4 ng/mL 3.2      Latest Reference Range & Units 01/24/23 12:24   Glutaminc Acid Decarboxylase 65 Ab 0.0 - 5.0 U/mL <5.0      Latest Reference Range & Units 01/24/23 12:24   EXT Creatinine Urine Not Estab. mg/dL 48.5   MICROALBUMIN/CREATININE RATIO 0 - 29 mg/g creat <6   MICROALBUM.,U,RANDOM Not Estab. ug/mL <3.0      Latest Reference Range & Units 09/27/22 08:35   Cholesterol See Comment mg/dL 128   Triglycerides See Comment mg/dL 130   HDL >=40 mg/dL 38 (L)   LDL Calculated 0 - 100 mg/dL 64   (L): Data is abnormally low    Impression & Plan:    Problem List Items Addressed This Visit    None        No orders of the defined types were placed in this encounter. There are no Patient Instructions on file for this visit. 44yM with T2DM- dx 2015 on 2 oral AHD without micro/macrovascular complications, htn, BMI >57 and Hepatic steatosis presents today for his diabetic care. Initial visit 10/22, Last visit 05/23. 1. T2DM:-   Given progression of diabetes to Hba1c of 11% [place] screened and ruled out. Patient is following diet, exercise plan and since starting ozempic this diabetes controlled as drastically improved to HbA1C of 6% now. No recent labs done, GCM data shows TIR 99% with only 1% high indicating excellent control. D/t market shortage of ozempic, discussed can try mounjaro is available. If not covered or not available, he will call other pharmacies nearby to see if they carry ozempic. Discussed rybelsus as last option    Plan  - c/w ozempic 2mg weekly  - c/w metformin to 1000mg XR daily   - will wait for Repeat HbA1C now and if stable repeat in 6 months    Screening:-   UTD ophthalmology   Uptodate on lipid, LDL at goal, On statin therapy, repeat now   Uptodate with Urine microalbumin- repeat now    2. Hypertension - BP in office today 122/78, at goal <140/90. No change in medication. C/w lisinopril 10 mg    3. Hyperlipidemia:- Will wait for Lipid panel. C/w simvastatin 20mg daily     RTC in 6 months     Discussed with the patient and all questioned fully answered. He will call me if any problems arise.     Counseled patient on diagnostic results, prognosis, risk and benefit of treatment options, instruction for management, importance of treatment compliance, Risk  factor reduction and impressions      Shamir Burch MD

## 2023-12-08 NOTE — ADDENDUM NOTE
Addended by: Nancie Ramos on: 12/8/2023 09:37 AM     Modules accepted: Orders 82F [place] speaking with hx of HTN, cholecystectomy and colon ca s/p resection 20 years ago presents with LBO. Patient not completely obstructed, currently passing flatus, last BM 2d ago.    Plan:   - Patient tolerating CLD; Advance diet as tolerated  - Recommend broad spectrum antibiotics  - GI recommendations appreciated; patient may benefit from colonoscopy for further evaluation if there is not pneumatosis coli  - Repeat CTAP to r/o pneumatosis coli  - Please start patient on Miralax BID and enemas to maintain BM and mobilize fecalith   - Closely monitor for worsening sx or decline in condition  - Surgery will follow closely    B team Surgery  [id number]    82F [place] speaking with hx of HTN, cholecystectomy and colon ca s/p resection 20 years ago presents with LBO. Patient not completely obstructed, currently passing flatus, last BM 2d ago.    Plan:   - Patient tolerating CLD; Advance diet as tolerated  - Recommend broad spectrum antibiotics  - GI recommendations appreciated; patient may benefit from colonoscopy for further evaluation if there is not pneumatosis coli  - Repeat CTAP to r/o pneumatosis coli  - Please start patient on Miralax BID and enemas to maintain BM and mobilize fecalith   - Closely monitor for worsening sx or decline in condition  - Surgery will follow closely    B team Surgery  [id number]

## 2023-12-13 LAB
ALBUMIN SERPL-MCNC: 4.7 G/DL (ref 4.1–5.1)
ALBUMIN/CREAT UR: 3 MG/G CREAT (ref 0–29)
ALBUMIN/GLOB SERPL: 2 {RATIO} (ref 1.2–2.2)
ALP SERPL-CCNC: 57 IU/L (ref 44–121)
ALT SERPL-CCNC: 24 IU/L (ref 0–44)
AST SERPL-CCNC: 19 IU/L (ref 0–40)
BILIRUB SERPL-MCNC: 0.5 MG/DL (ref 0–1.2)
BUN SERPL-MCNC: 11 MG/DL (ref 6–24)
BUN/CREAT SERPL: 9 (ref 9–20)
CALCIUM SERPL-MCNC: 9.5 MG/DL (ref 8.7–10.2)
CHLORIDE SERPL-SCNC: 102 MMOL/L (ref 96–106)
CHOLEST SERPL-MCNC: 163 MG/DL (ref 100–199)
CO2 SERPL-SCNC: 22 MMOL/L (ref 20–29)
CREAT SERPL-MCNC: 1.17 MG/DL (ref 0.76–1.27)
CREAT UR-MCNC: 278.3 MG/DL
EGFR: 79 ML/MIN/1.73
GLOBULIN SER-MCNC: 2.4 G/DL (ref 1.5–4.5)
GLUCOSE SERPL-MCNC: 111 MG/DL (ref 70–99)
HBA1C MFR BLD: 6.3 % (ref 4.8–5.6)
HDLC SERPL-MCNC: 40 MG/DL
LDLC SERPL CALC-MCNC: 94 MG/DL (ref 0–99)
MICROALBUMIN UR-MCNC: 7.9 UG/ML
POTASSIUM SERPL-SCNC: 4.8 MMOL/L (ref 3.5–5.2)
PROT SERPL-MCNC: 7.1 G/DL (ref 6–8.5)
SL AMB VLDL CHOLESTEROL CALC: 29 MG/DL (ref 5–40)
SODIUM SERPL-SCNC: 138 MMOL/L (ref 134–144)
TRIGL SERPL-MCNC: 169 MG/DL (ref 0–149)

## 2024-01-14 DIAGNOSIS — E11.9 TYPE 2 DIABETES MELLITUS WITHOUT COMPLICATION, WITHOUT LONG-TERM CURRENT USE OF INSULIN (HCC): ICD-10-CM

## 2024-01-15 RX ORDER — METFORMIN HYDROCHLORIDE 500 MG/1
TABLET, EXTENDED RELEASE ORAL
Qty: 60 TABLET | Refills: 6 | Status: SHIPPED | OUTPATIENT
Start: 2024-01-15

## 2024-04-04 DIAGNOSIS — E11.9 TYPE 2 DIABETES MELLITUS WITHOUT COMPLICATION, WITHOUT LONG-TERM CURRENT USE OF INSULIN (HCC): Primary | ICD-10-CM

## 2024-05-30 DIAGNOSIS — Z00.00 ENCOUNTER FOR PREVENTIVE HEALTH EXAMINATION: Primary | ICD-10-CM

## 2024-07-09 LAB
ALBUMIN SERPL-MCNC: 4.4 G/DL (ref 4.1–5.1)
ALBUMIN/CREAT UR: 4 MG/G CREAT (ref 0–29)
ALP SERPL-CCNC: 117 IU/L (ref 44–121)
ALT SERPL-CCNC: 23 IU/L (ref 0–44)
AST SERPL-CCNC: 17 IU/L (ref 0–40)
BILIRUB SERPL-MCNC: 0.5 MG/DL (ref 0–1.2)
BUN SERPL-MCNC: 11 MG/DL (ref 6–24)
BUN/CREAT SERPL: 10 (ref 9–20)
CALCIUM SERPL-MCNC: 9.4 MG/DL (ref 8.7–10.2)
CHLORIDE SERPL-SCNC: 101 MMOL/L (ref 96–106)
CHOLEST SERPL-MCNC: 185 MG/DL (ref 100–199)
CO2 SERPL-SCNC: 20 MMOL/L (ref 20–29)
CREAT SERPL-MCNC: 1.09 MG/DL (ref 0.76–1.27)
CREAT UR-MCNC: 97.4 MG/DL
EGFR: 86 ML/MIN/1.73
GLOBULIN SER-MCNC: 2.4 G/DL (ref 1.5–4.5)
GLUCOSE SERPL-MCNC: 376 MG/DL (ref 70–99)
HBA1C MFR BLD: 14 % (ref 4.8–5.6)
HDLC SERPL-MCNC: 32 MG/DL
LDLC SERPL CALC-MCNC: 92 MG/DL (ref 0–99)
MICROALBUMIN UR-MCNC: 4 UG/ML
POTASSIUM SERPL-SCNC: 4.2 MMOL/L (ref 3.5–5.2)
PROT SERPL-MCNC: 6.8 G/DL (ref 6–8.5)
SL AMB VLDL CHOLESTEROL CALC: 61 MG/DL (ref 5–40)
SODIUM SERPL-SCNC: 136 MMOL/L (ref 134–144)
TRIGL SERPL-MCNC: 365 MG/DL (ref 0–149)

## 2024-07-10 ENCOUNTER — OFFICE VISIT (OUTPATIENT)
Dept: ENDOCRINOLOGY | Facility: HOSPITAL | Age: 44
End: 2024-07-10
Payer: COMMERCIAL

## 2024-07-10 VITALS
BODY MASS INDEX: 30.21 KG/M2 | WEIGHT: 235.4 LBS | SYSTOLIC BLOOD PRESSURE: 136 MMHG | HEART RATE: 108 BPM | HEIGHT: 74 IN | DIASTOLIC BLOOD PRESSURE: 98 MMHG

## 2024-07-10 DIAGNOSIS — E66.09 CLASS 1 OBESITY DUE TO EXCESS CALORIES WITH SERIOUS COMORBIDITY AND BODY MASS INDEX (BMI) OF 32.0 TO 32.9 IN ADULT: ICD-10-CM

## 2024-07-10 DIAGNOSIS — E11.9 TYPE 2 DIABETES MELLITUS WITHOUT COMPLICATION, WITHOUT LONG-TERM CURRENT USE OF INSULIN (HCC): Primary | ICD-10-CM

## 2024-07-10 DIAGNOSIS — I10 PRIMARY HYPERTENSION: ICD-10-CM

## 2024-07-10 PROCEDURE — 99214 OFFICE O/P EST MOD 30 MIN: CPT | Performed by: STUDENT IN AN ORGANIZED HEALTH CARE EDUCATION/TRAINING PROGRAM

## 2024-07-10 PROCEDURE — 95251 CONT GLUC MNTR ANALYSIS I&R: CPT | Performed by: STUDENT IN AN ORGANIZED HEALTH CARE EDUCATION/TRAINING PROGRAM

## 2024-07-10 RX ORDER — METFORMIN HYDROCHLORIDE 500 MG/1
2000 TABLET, EXTENDED RELEASE ORAL
Qty: 400 TABLET | Refills: 6 | Status: SHIPPED | OUTPATIENT
Start: 2024-07-10

## 2024-07-10 NOTE — PROGRESS NOTES
Established Patient Progress Note       No chief complaint on file.     History of Present Illness:   Osorio Brown is a 43 y.o. male with a history of type 2 diabetes 7-8 years ago without any micro/macrovascular complication, newly diagnosed hypertension, BMI >30, NAFLD presents today for diabetic care. Initial visit 10/22. Last visit 12/23, lost to follow up for 7 months. Presents now with a rapid raise in his A1C    Reports couldn't get ozempic since feb and just fell of the wagon. Reports wasn't very careful with diet and also didn't take metformin for a few weeks. Knows he needs to be better.     Blood Sugar/Glucometer/Pump/CGM review: Wears CGM PRN when needed as pays out of pocket  Osorio Brown   Device used Cassie 2    Indication   Type 2 Diabetes    More than 72 hours of data was reviewed. Report to be scanned to chart.     Date Range: Only has CGM for 3 days     Analysis of data:   Average Glucose:     Time in Target Range 8%  Time Above Range: 92%    Interpretation of data: high     Current regime:- Metformin 1000mg daily, not taking his ozempic.   Medications tried/failed in past:- Glipizide (d/c d/t hypoglycemia)  Hypoglycemic episodes: None  Hyperglycemia- denies any polyuria, polydipsia, Denies any blurry vision, numbness/tingling to hands/feet.   Diet- reports diet was poor recently but will be working on getting back to normal   Activity- stays active  Weight- stable     last eye exam - Hasn't seen eye doctor yet. Forgot about it. Will follow up- has names with him  last foot exam - 10/22 in clinic.  History of hypertension- Yes, is On lisinopril 10 mg  History of hyperlipidemia- Yes on 20mg simvastatin  Last lipid- LDL <100 but elevated TG 07/24  Last Urine microalbumin- 07/24 normal   Last HbA1C 07/24 14%, 12/23 6.3%     Patient Active Problem List   Diagnosis    Skin lesion    Type 2 diabetes mellitus without complication, without long-term current use of insulin (HCC)    Chronic  right shoulder pain    History of colon polyps    Class 1 obesity due to excess calories with serious comorbidity and body mass index (BMI) of 32.0 to 32.9 in adult    Fatty liver    Primary hypertension    Thoracic aortic ectasia (HCC)      Past Medical History:   Diagnosis Date    Diabetes mellitus (HCC)       Past Surgical History:   Procedure Laterality Date    EYE SURGERY  2012    LASIK    TONSILLECTOMY Bilateral 2002      Family History   Problem Relation Age of Onset    Hypertension Mother     Hyperlipidemia Mother     Diabetes Mother     Diabetes type II Mother     Hypertension Father     Diabetes Father      Social History     Tobacco Use    Smoking status: Never    Smokeless tobacco: Never   Substance Use Topics    Alcohol use: Yes     Alcohol/week: 4.0 standard drinks of alcohol     Types: 4 Standard drinks or equivalent per week     No Known Allergies    Current Outpatient Medications:     Continuous Blood Gluc  (FreeStyle Cassie 14 Day [place]) JESSA, USE 1 SENSOR EVERY 14 DAYS, Disp: , Rfl:     Continuous Blood Gluc Sensor (FreeStyle Cassie 2 Sensor) MISC, Use 1 each every 14 (fourteen) days, Disp: 6 each, Rfl: 1    lisinopril (ZESTRIL) 10 mg tablet, TAKE 1 TABLET BY MOUTH EVERY DAY, Disp: 90 tablet, Rfl: 1    metFORMIN (GLUCOPHAGE-XR) 500 mg 24 hr tablet, TAKE 2 TABLETS BY MOUTH DAILY WITH DINNER, Disp: 60 tablet, Rfl: 6    semaglutide, 2 mg/dose, (Ozempic, 2 MG/DOSE,) 8 mg/ mL injection pen, Inject 0.75 mL (2 mg total) under the skin every 7 days, Disp: 9 mL, Rfl: 1    simvastatin (ZOCOR) 20 mg tablet, Take 1 tablet (20 mg total) by mouth daily at bedtime, Disp: 90 tablet, Rfl: 1    Review of Systems   Constitutional: Negative for activity change, appetite change, fatigue and unexpected weight change.   Respiratory: Negative for shortness of breath.    Cardiovascular: Negative for palpitations and leg swelling.   Gastrointestinal: Negative for abdominal pain, diarrhea and nausea.   Endocrine:  negative for polydipsia. Negative for polyphagia and polyuria.   Musculoskeletal: Negative for arthralgias and myalgias.   Skin: Negative for color change.   Neurological: Negative for dizziness, tremors, weakness and light-headedness.       Physical Exam:  There is no height or weight on file to calculate BMI.  There were no vitals taken for this visit.   Wt Readings from Last 3 Encounters:   12/08/23 109 kg (240 lb)   07/14/23 108 kg (237 lb)   03/31/23 108 kg (237 lb 6.4 oz)     Physical Exam  Constitutional:       Appearance: Normal appearance. He is obese.   Eyes:      Pupils: Pupils are equal, round, and reactive to light.   Cardiovascular:      Rate and Rhythm: Normal rate and regular rhythm.      Pulses:           Dorsalis pedis pulses are 2+ on the right side and 2+ on the left side.   Pulmonary:      Effort: Pulmonary effort is normal.      Breath sounds: Normal breath sounds.   Abdominal:      General: Abdomen is flat. Bowel sounds are normal.      Palpations: Abdomen is soft.   Feet:      Right foot:      Skin integrity: No ulcer, skin breakdown, erythema, warmth, callus or dry skin.      Left foot:      Skin integrity: No ulcer, skin breakdown, erythema, warmth, callus or dry skin.   Skin:     General: Skin is warm.      Capillary Refill: Capillary refill takes less than 2 seconds.   Neurological:      General: No focal deficit present.      Mental Status: He is alert and oriented to person, place, and time.   Psychiatric:         Mood and Affect: Mood normal.     Labs:    Latest Reference Range & Units 09/27/22 08:35 01/24/23 12:24 01/27/23 12:53 03/28/23 12:33 07/10/23 12:38 12/12/23 08:31 07/08/24 08:10   Hemoglobin A1C 4.8 - 5.6 % 8.9 (H)  11.1 ! 8.7 (H) 6.0 (H) 6.3 (H) 14.0 (H)   eAG, EST AVG Glucose mg/dL 209   203      C-PEPTIDE 1.1 - 4.4 ng/mL  3.2        CALCIUM 8.7 - 10.2 mg/dL      9.5 9.4   (H): Data is abnormally high  !: Data is abnormal   Latest Reference Range & Units 12/12/23 08:31  07/08/24 08:10   EXT Creatinine Urine Not Estab. mg/dL 278.3 97.4   Albumin Creat Ratio 0 - 29 mg/g creat 3 4   Albumin,U,Random Not Estab. ug/mL 7.9 4.0      Latest Reference Range & Units 12/12/23 08:31 07/08/24 08:10   Cholesterol 100 - 199 mg/dL 163 185   Triglycerides 0 - 149 mg/dL 169 (H) 365 (H)   HDL >39 mg/dL 40 32 (L)   LDL Calculated 0 - 99 mg/dL 94 92   VLDL Cholesterol Cuong 5 - 40 mg/dL 29 61 (H)   (H): Data is abnormally high  (L): Data is abnormally low    Impression & Plan:    Problem List Items Addressed This Visit    None        No orders of the defined types were placed in this encounter.      There are no Patient Instructions on file for this visit.     42yM with T2DM- dx 2015 on 2 oral AHD without micro/macrovascular complications, htn, BMI >30 and Hepatic steatosis presents today for his diabetic care. Initial visit 10/22, Last visit 12/23.     1. T2DM:-   Given progression of diabetes to Hba1c of 11% [place] screened and ruled out. Patient's diabetes was previously well controlled but recently raised back up to 14% d/t non compliance with meds and diet leading to poor control. Discussed importance of compliance and resume his ozempic and c/w metformin. Work on dietary changes as before and send me CGM download in 3-4 weeks to review to make sure BG are improving.     Plan  - Resume ozempic 2mg weekly  - c/w metformin but inc to 2000mg daily for now   - c/w CGM wear, send data in 3=4 weeks       Screening:-   UTD ophthalmology   Uptodate on lipid, LDL at goal, On statin therapy, repeat 3 months  Uptodate with Urine microalbumin- repeat 3 months     2. Hypertension - BP in office 136/98  , at goal <140/90. No change in medication. C/w lisinopril 10 mg    3. Hyperlipidemia:- LDL at goal, TG elevated d/t high BG, work on improving glycemic control. C/w simvastatin 20mg daily     RTC in 3 months     Discussed with the patient and all questioned fully answered. He will call me if any problems  arise.    Counseled patient on diagnostic results, prognosis, risk and benefit of treatment options, instruction for management, importance of treatment compliance, Risk  factor reduction and impressions      Najma Sanchez MD

## 2024-07-13 DIAGNOSIS — E11.9 TYPE 2 DIABETES MELLITUS WITHOUT COMPLICATION, WITHOUT LONG-TERM CURRENT USE OF INSULIN (HCC): ICD-10-CM

## 2024-07-13 RX ORDER — SIMVASTATIN 20 MG
20 TABLET ORAL
Qty: 30 TABLET | Refills: 5 | Status: SHIPPED | OUTPATIENT
Start: 2024-07-13

## 2024-07-15 LAB
LEFT EYE DIABETIC RETINOPATHY: NORMAL
RIGHT EYE DIABETIC RETINOPATHY: NORMAL

## 2024-07-19 LAB
LEFT EYE DIABETIC RETINOPATHY: NORMAL
RIGHT EYE DIABETIC RETINOPATHY: NORMAL

## 2024-07-19 PROCEDURE — 2023F DILAT RTA XM W/O RTNOPTHY: CPT | Performed by: STUDENT IN AN ORGANIZED HEALTH CARE EDUCATION/TRAINING PROGRAM

## 2024-08-11 NOTE — ASSESSMENT & PLAN NOTE
[employer] is the  [facility] He and his family reside in [place], [place],    Osorio's past medical history is significant for type 2 diabetes, hypertension, hyperlipidemia, nonalcoholic fatty liver disease, and obesity.  His prescription medications include metformin 2000 mg daily, Ozempic 2 mg weekly, simvastatin 20 mg daily.    His past surgical history is significant for tonsillectomy in 2002 and LASIK in 2012.    Osorio's family history is significant for hypertension (father) and diabetes, hypertension, hyperlipidemia (mother).  Osorio also has a brother who is 42 years old and in good health.    He is a non-smoker.  He drinks approximately 5-6 alcoholic beverages 2 times a month.  He does not consume caffeine on a regular basis.  Osorio is  with 2 children.  He considers his diet somewhat healthy, but plenty of room for improvement (especially when he travels for work).  He does not exercise regularly.    Osorio's health concerns today include his lack of exercise and his diabetic control.

## 2024-08-13 ENCOUNTER — APPOINTMENT (OUTPATIENT)
Dept: LAB | Facility: CLINIC | Age: 44
End: 2024-08-13

## 2024-08-13 ENCOUNTER — HOSPITAL ENCOUNTER (OUTPATIENT)
Dept: NON INVASIVE DIAGNOSTICS | Facility: CLINIC | Age: 44
Discharge: HOME | End: 2024-08-13

## 2024-08-13 ENCOUNTER — OFFICE VISIT (OUTPATIENT)
Dept: FAMILY MEDICINE CLINIC | Facility: CLINIC | Age: 44
End: 2024-08-13

## 2024-08-13 ENCOUNTER — HOSPITAL ENCOUNTER (OUTPATIENT)
Dept: VASCULAR ULTRASOUND | Facility: HOSPITAL | Age: 44
Discharge: HOME | End: 2024-08-13

## 2024-08-13 ENCOUNTER — HOSPITAL ENCOUNTER (OUTPATIENT)
Dept: ULTRASOUND IMAGING | Facility: HOSPITAL | Age: 44
Discharge: HOME | End: 2024-08-13

## 2024-08-13 VITALS
RESPIRATION RATE: 14 BRPM | WEIGHT: 229.2 LBS | HEART RATE: 90 BPM | HEIGHT: 74 IN | SYSTOLIC BLOOD PRESSURE: 126 MMHG | BODY MASS INDEX: 29.41 KG/M2 | OXYGEN SATURATION: 99 % | DIASTOLIC BLOOD PRESSURE: 90 MMHG

## 2024-08-13 VITALS
HEART RATE: 90 BPM | WEIGHT: 229 LBS | BODY MASS INDEX: 29.39 KG/M2 | HEIGHT: 74 IN | DIASTOLIC BLOOD PRESSURE: 90 MMHG | SYSTOLIC BLOOD PRESSURE: 126 MMHG

## 2024-08-13 DIAGNOSIS — Z00.00 ENCOUNTER FOR PREVENTIVE HEALTH EXAMINATION: ICD-10-CM

## 2024-08-13 DIAGNOSIS — I10 PRIMARY HYPERTENSION: ICD-10-CM

## 2024-08-13 DIAGNOSIS — E11.9 TYPE 2 DIABETES MELLITUS WITHOUT COMPLICATION, WITHOUT LONG-TERM CURRENT USE OF INSULIN (HCC): Primary | ICD-10-CM

## 2024-08-13 DIAGNOSIS — K76.0 NAFLD (NONALCOHOLIC FATTY LIVER DISEASE): ICD-10-CM

## 2024-08-13 DIAGNOSIS — E66.3 OVERWEIGHT (BMI 25.0-29.9): ICD-10-CM

## 2024-08-13 DIAGNOSIS — I77.810 THORACIC AORTIC ECTASIA (HCC): ICD-10-CM

## 2024-08-13 DIAGNOSIS — E78.2 MIXED HYPERLIPIDEMIA: ICD-10-CM

## 2024-08-13 LAB
25(OH)D3 SERPL-MCNC: 38.2 NG/ML (ref 30–100)
ALBUMIN SERPL BCG-MCNC: 4.7 G/DL (ref 3.5–5)
ALP SERPL-CCNC: 59 U/L (ref 34–104)
ALT SERPL W P-5'-P-CCNC: 20 U/L (ref 7–52)
ANION GAP SERPL CALCULATED.3IONS-SCNC: 8 MMOL/L (ref 4–13)
AORTIC ROOT: 3.1 CM
APICAL FOUR CHAMBER EJECTION FRACTION: 50 %
ASCENDING AORTA: 4 CM
AST SERPL W P-5'-P-CCNC: 13 U/L (ref 13–39)
ATRIAL RATE: 89 BPM
BACTERIA UR QL AUTO: ABNORMAL /HPF
BASOPHILS # BLD AUTO: 0.04 THOUSANDS/ÂΜL (ref 0–0.1)
BASOPHILS NFR BLD AUTO: 1 % (ref 0–1)
BILIRUB SERPL-MCNC: 0.69 MG/DL (ref 0.2–1)
BILIRUB UR QL STRIP: NEGATIVE
BSA FOR ECHO PROCEDURE: 2.29 M2
BUN SERPL-MCNC: 12 MG/DL (ref 5–25)
CALCIUM SERPL-MCNC: 9.6 MG/DL (ref 8.4–10.2)
CHLORIDE SERPL-SCNC: 100 MMOL/L (ref 96–108)
CHOLEST SERPL-MCNC: 138 MG/DL
CLARITY UR: CLEAR
CO2 SERPL-SCNC: 27 MMOL/L (ref 21–32)
COLOR UR: ABNORMAL
CREAT SERPL-MCNC: 0.97 MG/DL (ref 0.6–1.3)
CRP SERPL HS-MCNC: 1.2 MG/L
E WAVE DECELERATION TIME: 137 MS
E/A RATIO: 0.78
EOSINOPHIL # BLD AUTO: 0.14 THOUSAND/ÂΜL (ref 0–0.61)
EOSINOPHIL NFR BLD AUTO: 2 % (ref 0–6)
ERYTHROCYTE [DISTWIDTH] IN BLOOD BY AUTOMATED COUNT: 12.1 % (ref 11.6–15.1)
EST. AVERAGE GLUCOSE BLD GHB EST-MCNC: 249 MG/DL
FRACTIONAL SHORTENING: 29 (ref 28–44)
GFR SERPL CREATININE-BSD FRML MDRD: 95 ML/MIN/1.73SQ M
GLUCOSE P FAST SERPL-MCNC: 125 MG/DL (ref 65–99)
GLUCOSE UR STRIP-MCNC: NEGATIVE MG/DL
HBA1C MFR BLD: 10.3 %
HCT VFR BLD AUTO: 47.9 % (ref 36.5–49.3)
HDLC SERPL-MCNC: 41 MG/DL
HGB BLD-MCNC: 16.3 G/DL (ref 12–17)
HGB UR QL STRIP.AUTO: NEGATIVE
IMM GRANULOCYTES # BLD AUTO: 0.02 THOUSAND/UL (ref 0–0.2)
IMM GRANULOCYTES NFR BLD AUTO: 0 % (ref 0–2)
INTERVENTRICULAR SEPTUM IN DIASTOLE (PARASTERNAL SHORT AXIS VIEW): 1.1 CM
INTERVENTRICULAR SEPTUM: 1.1 CM (ref 0.6–1.1)
KETONES UR STRIP-MCNC: NEGATIVE MG/DL
LAAS-AP2: 16.7 CM2
LAAS-AP4: 21.2 CM2
LDLC SERPL CALC-MCNC: 65 MG/DL (ref 0–100)
LEFT ATRIUM SIZE: 3 CM
LEFT ATRIUM VOLUME (MOD BIPLANE): 52 ML
LEFT ATRIUM VOLUME INDEX (MOD BIPLANE): 22.7 ML/M2
LEFT INTERNAL DIMENSION IN SYSTOLE: 3.2 CM (ref 2.1–4)
LEFT VENTRICULAR INTERNAL DIMENSION IN DIASTOLE: 4.5 CM (ref 3.5–6)
LEFT VENTRICULAR POSTERIOR WALL IN END DIASTOLE: 1.1 CM
LEFT VENTRICULAR STROKE VOLUME: 51 ML
LEUKOCYTE ESTERASE UR QL STRIP: NEGATIVE
LVSV (TEICH): 51 ML
LYMPHOCYTES # BLD AUTO: 2.37 THOUSANDS/ÂΜL (ref 0.6–4.47)
LYMPHOCYTES NFR BLD AUTO: 37 % (ref 14–44)
MAX HR PERCENT: 103 %
MAX HR: 184 BPM
MCH RBC QN AUTO: 32 PG (ref 26.8–34.3)
MCHC RBC AUTO-ENTMCNC: 34 G/DL (ref 31.4–37.4)
MCV RBC AUTO: 94 FL (ref 82–98)
MONOCYTES # BLD AUTO: 0.46 THOUSAND/ÂΜL (ref 0.17–1.22)
MONOCYTES NFR BLD AUTO: 7 % (ref 4–12)
MUCOUS THREADS UR QL AUTO: ABNORMAL
MV E'TISSUE VEL-SEP: 9 CM/S
MV PEAK A VEL: 0.69 M/S
MV PEAK E VEL: 54 CM/S
MV STENOSIS PRESSURE HALF TIME: 40 MS
MV VALVE AREA P 1/2 METHOD: 5.5
NEUTROPHILS # BLD AUTO: 3.31 THOUSANDS/ÂΜL (ref 1.85–7.62)
NEUTS SEG NFR BLD AUTO: 53 % (ref 43–75)
NITRITE UR QL STRIP: NEGATIVE
NON-SQ EPI CELLS URNS QL MICRO: ABNORMAL /HPF
NRBC BLD AUTO-RTO: 0 /100 WBCS
P AXIS: 25 DEGREES
PH UR STRIP.AUTO: 5.5 [PH]
PLATELET # BLD AUTO: 277 THOUSANDS/UL (ref 149–390)
PMV BLD AUTO: 7.9 FL (ref 8.9–12.7)
POST LVEF: 70 %
POTASSIUM SERPL-SCNC: 4.3 MMOL/L (ref 3.5–5.3)
PR INTERVAL: 134 MS
PROT SERPL-MCNC: 7.3 G/DL (ref 6.4–8.4)
PROT UR STRIP-MCNC: NEGATIVE MG/DL
PSA SERPL-MCNC: 0.67 NG/ML (ref 0–4)
QRS AXIS: 8 DEGREES
QRSD INTERVAL: 94 MS
QT INTERVAL: 356 MS
QTC INTERVAL: 433 MS
RATE PRESSURE PRODUCT: NORMAL
RBC # BLD AUTO: 5.1 MILLION/UL (ref 3.88–5.62)
RBC #/AREA URNS AUTO: ABNORMAL /HPF
RIGHT ATRIAL 2D VOLUME: 31 ML
RIGHT ATRIUM AREA SYSTOLE A4C: 13.7 CM2
RIGHT VENTRICLE ID DIMENSION: 3.9 CM
SINOTUBULAR JUNCTION: 3 CM
SL CV LEFT ATRIUM LENGTH A2C: 5.3 CM
SL CV LV EF: 60
SL CV LV EF: 60
SL CV PED ECHO LEFT VENTRICLE DIASTOLIC VOLUME (MOD BIPLANE) 2D: 92 ML
SL CV PED ECHO LEFT VENTRICLE SYSTOLIC VOLUME (MOD BIPLANE) 2D: 41 ML
SL CV SINUS OF VALSALVA 2D: 3.3 CM
SL CV STRESS RECOVERY BP: NORMAL MMHG
SL CV STRESS RECOVERY HR: 123 BPM
SL CV STRESS RECOVERY O2 SAT: 97 %
SL CV STRESS STAGE REACHED: 4
SODIUM SERPL-SCNC: 135 MMOL/L (ref 135–147)
SP GR UR STRIP.AUTO: 1.02 (ref 1–1.03)
STJ: 3 CM
STRESS ANGINA INDEX: 0
STRESS BASELINE BP: NORMAL MMHG
STRESS BASELINE HR: 106 BPM
STRESS O2 SAT REST: 98 %
STRESS PEAK HR: 184 BPM
STRESS POST ESTIMATED WORKLOAD: 11.7 METS
STRESS POST EXERCISE DUR MIN: 10 MIN
STRESS POST O2 SAT PEAK: 98 %
STRESS POST PEAK BP: 164 MMHG
T WAVE AXIS: 30 DEGREES
TRICUSPID ANNULAR PLANE SYSTOLIC EXCURSION: 1.7 CM
TRIGL SERPL-MCNC: 161 MG/DL
TSH SERPL DL<=0.05 MIU/L-ACNC: 1.79 UIU/ML (ref 0.45–4.5)
UROBILINOGEN UR STRIP-ACNC: <2 MG/DL
VENTRICULAR RATE: 89 BPM
WBC # BLD AUTO: 6.34 THOUSAND/UL (ref 4.31–10.16)
WBC #/AREA URNS AUTO: ABNORMAL /HPF

## 2024-08-13 PROCEDURE — 93010 ELECTROCARDIOGRAM REPORT: CPT | Performed by: INTERNAL MEDICINE

## 2024-08-13 PROCEDURE — 86141 C-REACTIVE PROTEIN HS: CPT

## 2024-08-13 PROCEDURE — 93005 ELECTROCARDIOGRAM TRACING: CPT

## 2024-08-13 PROCEDURE — 93350 STRESS TTE ONLY: CPT

## 2024-08-13 PROCEDURE — 85025 COMPLETE CBC W/AUTO DIFF WBC: CPT

## 2024-08-13 PROCEDURE — 80061 LIPID PANEL: CPT

## 2024-08-13 PROCEDURE — 84153 ASSAY OF PSA TOTAL: CPT

## 2024-08-13 PROCEDURE — 83036 HEMOGLOBIN GLYCOSYLATED A1C: CPT

## 2024-08-13 PROCEDURE — 93306 TTE W/DOPPLER COMPLETE: CPT | Performed by: INTERNAL MEDICINE

## 2024-08-13 PROCEDURE — VASC: Performed by: SURGERY

## 2024-08-13 PROCEDURE — 84443 ASSAY THYROID STIM HORMONE: CPT

## 2024-08-13 PROCEDURE — 93350 STRESS TTE ONLY: CPT | Performed by: INTERNAL MEDICINE

## 2024-08-13 PROCEDURE — 93922 UPR/L XTREMITY ART 2 LEVELS: CPT

## 2024-08-13 PROCEDURE — 81001 URINALYSIS AUTO W/SCOPE: CPT

## 2024-08-13 PROCEDURE — 80053 COMPREHEN METABOLIC PANEL: CPT

## 2024-08-13 PROCEDURE — 99499EX: Performed by: FAMILY MEDICINE

## 2024-08-13 PROCEDURE — 76700 US EXAM ABDOM COMPLETE: CPT

## 2024-08-13 PROCEDURE — 82306 VITAMIN D 25 HYDROXY: CPT

## 2024-08-13 PROCEDURE — 36415 COLL VENOUS BLD VENIPUNCTURE: CPT

## 2024-08-13 PROCEDURE — 93306 TTE W/DOPPLER COMPLETE: CPT

## 2024-08-13 RX ORDER — LOSARTAN POTASSIUM 25 MG/1
25 TABLET ORAL DAILY
Qty: 90 TABLET | Refills: 1 | Status: SHIPPED | OUTPATIENT
Start: 2024-08-13

## 2024-08-13 NOTE — PROGRESS NOTES
Nutritional Summary and Recommendations:    Patient Nutrition-Oriented Medical/Diet Hx  Osorio presents today to [facility] for nutrition re-assessment and counseling. Since last visit in 2022, Osorio has successfully lost ~30 lb through dietary changes. A1C increased since previous visit, Osorio reports stopping medication when difficulty obtaining. Restarted on medications around 6 months ago, anticipate A1C to improve. Discussion focused on continuing healthy diet for diabetes, artifical sweeteners, and appropriate portion sizes. Labs reviewed.     Nutrition Related Medications/Supplements:  Metformin, Ozempic, simvastatin     Labs:  A1C 10.3  Total Cholesterol 138  Triglycerides 161  HDL 41  LDL 65  Vitamin D 38.2     Anthropometrics:     Ht: 6 ft 2 in Wt: 226.8 lb   BMI: 29.1      BMR:  2135 kcals Fat%:  25.7% Acceptable Range: 11-22%     Fat Mass: 58.2 lb FFM:  168.6 lb TBW: 123.4 lb      Nutrition Diagnosis:  Altered nutrition related laboratory values  r/t physiological events as evidenced by A1C 10.3      Nutrition Recommendations:    Reduce A1C levels <7.0 within 3-6 months through diet and medication management.  Continue to eat a carbohydrate consistent diet-Refer to portion booklet for appropriate portion sizes.  Incorporate a protein food when snacking on fruit.   Aim for gradual weight loss of 1-2 lb/week through diet and exercise to reach desired goal weight of 220 lb.   Contact RD with any questions or concerns.    Nutrition Education     Diabetes Nutrition Education and Healthy Snacking       Perceived Comprehension:   Good    Expected Compliance:  Good

## 2024-08-13 NOTE — PROGRESS NOTES
Fitness Summary and Recommendations: Osorio scored at the 20% on his body composition assessment with a bodyfat % of 25.7%. Osorio scored in the fair range for flexibility with a sit & reach score of 15 cm. His Muscle Strength/Endurance scores placed him at the 50% (lower body) and 95% (upper body) with a chair stand score of 27 and arm curl score of 35 respectively. Osorio's Cardiovascular Score of 47.2 placed him at the 85%. Overall, Osorio would be considered to have an average fitness level with a 55% score. His overall fitness score has increased by 5% from his previous test on 09/07/22. Continued emphasis on regular exercise and sound nutrition practices will enable Osorio to reach his optimal level of fitness.

## 2024-08-13 NOTE — ASSESSMENT & PLAN NOTE
Lab Results   Component Value Date    HGBA1C 10.3 (H) 08/13/2024   Your diabetic control is poor today (A1c 10.3%).  You mentioned that you recently restarted your Ozempic 2 mg weekly along with metformin 2000.  I would consult with your endocrinologist in the near future to discuss future treatment strategy.  I would also like to start you on losartan today; this should help your blood pressure as well as help to protect your kidneys.

## 2024-08-13 NOTE — ASSESSMENT & PLAN NOTE
Your weight today is 229.  Your body mass index (BMI) is 29.83.  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  I would recommend continuing to work on healthy diet.  I was also recommend restarting an exercise program (at least 150 minutes of aerobic exercise weekly).  I would like to suggest a goal weight of approximately 215 to 220 pounds.

## 2024-08-13 NOTE — PROGRESS NOTES
"  Your [facility] Board-Certified Dermatologist's Name: Kelly Vera MD    Skin Screening Exam:    A chaperone was present throughout the entire encounter.      SKIN:  FULL ORGAN SYSTEM EXAM   Hair, Scalp, Ears, Face Normal except as noted below in Assessment   Neck, Cervical Chain Nodes Normal except as noted below in Assessment   Right Arm/Hand/Fingers Normal except as noted below in Assessment   Left Arm/Hand/Fingers Normal except as noted below in Assessment   Chest/Breasts/Axillae Did the patient specifically refuse to have the areas \"under-the-bra\" examined by the Dermatologist? No  Examined areas normal except as noted below in Assessment   Abdomen, Umbilicus Normal except as noted below in Assessment   Back/Spine Normal except as noted below in Assessment       Right Leg, Foot, Toes Normal except as noted below in Assessment   Left Leg, Foot, Toes Normal except as noted below in Assessment        Assessment and Plan by Diagnosis:    Use a moisturizer + sunscreen \"combo\" product such as Neutrogena Daily Defense SPF 50+ or CeraVe AM at least three times a day.  Follow-up with your private dermatologist or one of our board-certified [facility] Dermatologists as discussed.  [facility]'s own Dr. Anitha Nicolas is available for consultation for skin enhancement and revitalization services; please mention \"[facility]\" for expedited scheduling    MELANOCYTIC NEVI (\"Moles\")    Physical Exam:  Anatomic Location Affected:   All over body  Morphological Description:  Scattered, 1-4mm round to ovoid, symmetrical-appearing, even bordered, skin colored to dark brown macules/papules, mostly in sun-exposed areas  Pertinent Positives:  Pertinent Negatives:    Additional History of Present Condition:  NA    Assessment and Plan:  Based on a thorough discussion of this condition and the management approach to it (including a comprehensive discussion of the known risks, side effects and potential benefits of " "treatment), the patient (family) agrees to implement the following specific plan:  Monitor for changes/growth, if so, please let us know  Needs yearly fully body skin exams     Melanocytic Nevi  Melanocytic nevi (\"moles\") are tan or brown, raised or flat areas of the skin which have an increased number of melanocytes. Melanocytes are the cells in our body which make pigment and account for skin color.    Some moles are present at birth (I.e., \"congenital nevi\"), while others come up later in life (i.e., \"acquired nevi\").  The sun can stimulate the body to make more moles.  Sunburns are not the only thing that triggers more moles.  Chronic sun exposure can do it too.     Clinically distinguishing a healthy mole from melanoma may be difficult, even for experienced dermatologists. The \"ABCDE's\" of moles have been suggested as a means of helping to alert a person to a suspicious mole and the possible increased risk of melanoma.  The suggestions for raising alert are as follows:    Asymmetry: Healthy moles tend to be symmetric, while melanomas are often asymmetric.  Asymmetry means if you draw a line through the mole, the two halves do not match in color, size, shape, or surface texture. Asymmetry can be a result of rapid enlargement of a mole, the development of a raised area on a previously flat lesion, scaling, ulceration, bleeding or scabbing within the mole.  Any mole that starts to demonstrate \"asymmetry\" should be examined promptly by a board certified dermatologist.     Border: Healthy moles tend to have discrete, even borders.  The border of a melanoma often blends into the normal skin and does not sharply delineate the mole from normal skin.  Any mole that starts to demonstrate \"uneven borders\" should be examined promptly by a board certified dermatologist.     Color: Healthy moles tend to be one color throughout.  Melanomas tend to be made up of different colors ranging from dark black, blue, white, or red.  " "Any mole that demonstrates a color change should be examined promptly by a board certified dermatologist.     Diameter: Healthy moles tend to be smaller than 0.6 cm in size; an exception are \"congenital nevi\" that can be larger.  Melanomas tend to grow and can often be greater than 0.6 cm (1/4 of an inch, or the size of a pencil eraser). This is only a guideline, and many normal moles may be larger than 0.6 cm without being unhealthy.  Any mole that starts to change in size (small to bigger or bigger to smaller) should be examined promptly by a board certified dermatologist.     Evolving: Healthy moles tend to \"stay the same.\"  Melanomas may often show signs of change or evolution such as a change in size, shape, color, or elevation.  Any mole that starts to itch, bleed, crust, burn, hurt, or ulcerate or demonstrate a change or evolution should be examined promptly by a board certified dermatologist.      Dysplastic Nevi  Dysplastic moles are moles that fit the ABCDE rules of melanoma but are not identified as melanomas when examined under the microscope.  They may indicate an increased risk of melanoma in that person. If there is a family history of melanoma, most experts agree that the person may be at an increased risk for developing a melanoma.  Experts still do not agree on what dysplastic moles mean in patients without a personal or family history of melanoma.  Dysplastic moles are usually larger than common moles and have different colors within it with irregular borders. The appearance can be very similar to a melanoma. Biopsies of dysplastic moles may show abnormalities which are different from a regular mole.      Melanoma  Malignant melanoma is a type of skin cancer that can be deadly if it spreads throughout the body. The incidence of melanoma in the [place] is growing faster than any other cancer. Melanoma usually grows near the surface of the skin for a period of time, and then begins to grow " "deeper into the skin. Once it grows deeper into the skin, the risk of spread to other organs greatly increases. Therefore, early detection and removal of a malignant melanoma may result in a better chance at a complete cure; removal after the tumor has spread may not be as effective, leading to worse clinical outcomes such as death.    The true rate of nevus transformation into a melanoma is unknown. It has been estimated that the lifetime risk for any acquired melanocytic nevus on any 20-year-old individual transforming into melanoma by age 80 is 0.03% (1 in 3,164) for men and 0.009% (1 in 10,800) for women.     The appearance of a \"new mole\" remains one of the most reliable methods for identifying a malignant melanoma.  Occasionally, melanomas appear as rapidly growing, blue-black, dome-shaped bumps within a previous mole or previous area of normal skin.  Other times, melanomas are suspected when a mole suddenly appears or changes. Itching, burning, or pain in a pigmented lesion should increase suspicion, but most patients with early melanoma have no skin discomfort whatsoever.  Melanoma can occur anywhere on the skin, including areas that are difficult for self-examination. Many melanomas are first noticed by other family members.  Suspicious-looking moles may be removed for microscopic examination.       You may be able to prevent death from melanoma by doing two simple things:    Try to avoid unnecessary sun exposure and protect your skin when it is exposed to the sun.  People who live near the equator, people who have intermittent exposures to large amounts of sun, and people who have had sunburns in childhood or adolescence have an increased risk for melanoma. Sun sense and vigilant sun protection may be keys to helping to prevent melanoma.  We recommend wearing UPF-rated sun protective clothing and sunglasses whenever possible and applying a moisturizer-sunscreen combination product (SPF 50+) such as " "Neutrogena Daily Defense to sun exposed areas of skin at least three times a day.    Have your moles regularly examined by a board certified dermatologist AND by yourself or a family member/friend at home.  We recommend that you have your moles examined at least once a year by a board certified dermatologist.  Use your birthday as an annual reminder to have your \"Birthday Suit\" (I.e., your skin) examined; it is a nice birthday gift to yourself to know that your skin is healthy appearing!  Additionally, at-home self examinations may be helpful for detecting a possible melanoma.  Use the ABCDEs we discussed and check your moles once a month at home.        "

## 2024-08-13 NOTE — PROGRESS NOTES
Hearing Assessment Summary:   Hearing Screening    250Hz 500Hz 1000Hz 2000Hz 4000Hz 8000Hz   Right ear 55 55 50 35 50 45   Left ear 35 40 50 55 55 45   Comments: HEARING EVALUATION    Name:  Osorio Brown  :  1980  Age:  43 y.o.   MRN:  [id number]  Date of Evaluation: 24     History: [facility] Exam  Reason for visit: Osorio Brown is being seen today for an evaluation of hearing as part of an [facility] examination.  patient reports no changes to his hearing since his last visit. He has a known moderate sensorineural hearing loss, bilaterally. He recently purchased hearing aids and is noting benefit from them.       EVALUATION:    Otoscopic Evaluation:   Right Ear: Clear and healthy ear canal and tympanic membrane   Left Ear: Clear and healthy ear canal and tympanic membrane    Tympanometry:   Right: Type A - normal middle ear pressure and compliance   Left: Type A - normal middle ear pressure and compliance    Audiogram Results:  Pure tone testing revealed a Mild sloping to Moderate sensorineural hearing loss (SNHL) hearing loss in the  left  ear and a Moderate rising to Mild sloping to moderate sensorineural hearing loss in the  right ear. SRT and PTA are in agreement indicating good test reliability. Word recognition scores were excellent in the  left  ear and excellent in the  right ear.           *see attached audiogram      RECOMMENDATIONS:  Annual hearing eval and Return to [facility]. for F/U    PATIENT EDUCATION:   Discussed results and recommendations with patient.  Questions were addressed and the patient was encouraged to contact our department should concerns arise.      Neva Dorado., [facility], [place]-[facility]  Clinical Audiologist'

## 2024-08-13 NOTE — PROGRESS NOTES
[facility] Physical Exam     Osorio Brown is a 43 y.o. male who is presenting for his second [facility] Physical Exam at [facility]. Osorio is the  for [employer] He and his family reside in [place], [place],    Osorio's past medical history is significant for type 2 diabetes, hypertension, hyperlipidemia, nonalcoholic fatty liver disease, and obesity.  His prescription medications include metformin 2000 mg daily, Ozempic 2 mg weekly, simvastatin 20 mg daily.    His past surgical history is significant for tonsillectomy in 2002 and LASIK in 2012.    Osorio's family history is significant for hypertension (father) and diabetes, hypertension, hyperlipidemia (mother).  Osorio also has a brother who is 42 years old and in good health.    He is a non-smoker.  He drinks approximately 5-6 alcoholic beverages once a month.  He does not consume caffeine on a regular basis.  Osorio is  with 2 children.  He considers his diet healthy, but with room for improvement (especially when he travels for work).  He has not been exercising regularly lately.    Osorio's health concerns today include his lack of exercise, hemorrhoids,  and his diabetic control.    Review of Systems   Constitutional:  Negative for chills and fever.   HENT:  Negative for ear pain and sore throat.    Eyes:  Negative for pain and visual disturbance.   Respiratory:  Negative for cough and shortness of breath.    Cardiovascular:  Negative for chest pain and palpitations.   Gastrointestinal:  Negative for abdominal pain and vomiting.        Hemorrhoids   Genitourinary:  Negative for dysuria and hematuria.   Musculoskeletal:  Negative for arthralgias and back pain.   Skin:  Negative for color change and rash.   Neurological:  Negative for seizures and syncope.   All other systems reviewed and are negative.        Active Ambulatory Problems     Diagnosis Date Noted   • Skin lesion  09/27/2022   • Type 2 diabetes mellitus without complication, without long-term current use of insulin (HCC) 09/27/2022   • Chronic right shoulder pain 09/27/2022   • History of colon polyps 09/27/2022   • Overweight (BMI 25.0-29.9) 09/27/2022   • NAFLD (nonalcoholic fatty liver disease) 09/27/2022   • Primary hypertension 09/27/2022   • Thoracic aortic ectasia (HCC) 09/27/2022   • Mixed hyperlipidemia 08/13/2024     Resolved Ambulatory Problems     Diagnosis Date Noted   • Well adult exam 09/27/2022   • Elevated ALT measurement 09/27/2022     Past Medical History:   Diagnosis Date   • Diabetes mellitus (HCC)    • Hyperlipidemia    • Hypertension        Past Surgical History:   Procedure Laterality Date   • EYE SURGERY  2012    LASIK   • TONSILLECTOMY Bilateral 2002       Family History   Problem Relation Age of Onset   • Hypertension Mother    • Hyperlipidemia Mother    • Diabetes Mother    • Diabetes type II Mother    • Hypertension Father    • Diabetes Father        Social History     Tobacco Use   Smoking Status Never   Smokeless Tobacco Never         Current Outpatient Medications:   •  losartan (COZAAR) 25 mg tablet, Take 1 tablet (25 mg total) by mouth daily, Disp: 90 tablet, Rfl: 1  •  Continuous Blood Gluc  (FreeStyle Cassie 14 Day [place]) JESSA, USE 1 SENSOR EVERY 14 DAYS, Disp: , Rfl:   •  Continuous Blood Gluc Sensor (FreeStyle Cassie 2 Sensor) MISC, Use 1 each every 14 (fourteen) days, Disp: 6 each, Rfl: 1  •  metFORMIN (GLUCOPHAGE-XR) 500 mg 24 hr tablet, Take 4 tablets (2,000 mg total) by mouth daily with dinner, Disp: 400 tablet, Rfl: 6  •  semaglutide, 2 mg/dose, (Ozempic, 2 MG/DOSE,) 8 mg/ mL injection pen, Inject 0.75 mL (2 mg total) under the skin every 7 days, Disp: 9 mL, Rfl: 1  •  simvastatin (ZOCOR) 20 mg tablet, TAKE 1 TABLET BY MOUTH DAILY AT BEDTIME, Disp: 30 tablet, Rfl: 5    No Known Allergies      Objective:    Vitals:    08/13/24 0854   BP: 126/90   Pulse: 90   Resp: 14   SpO2:  "99%   Weight: 104 kg (229 lb 3.2 oz)   Height: 6' 1.5\" (1.867 m)        Physical Exam  Constitutional:       Appearance: Normal appearance.   HENT:      Head: Normocephalic and atraumatic.      Right Ear: Tympanic membrane, ear canal and external ear normal. There is no impacted cerumen.      Left Ear: Tympanic membrane, ear canal and external ear normal. There is no impacted cerumen.      Nose: Nose normal.      Mouth/Throat:      Mouth: Mucous membranes are moist.      Pharynx: Oropharynx is clear. No posterior oropharyngeal erythema.   Eyes:      Extraocular Movements: Extraocular movements intact.      Conjunctiva/sclera: Conjunctivae normal.      Pupils: Pupils are equal, round, and reactive to light.   Neck:      Vascular: No carotid bruit.   Cardiovascular:      Rate and Rhythm: Normal rate and regular rhythm.      Pulses: Normal pulses.      Heart sounds: Normal heart sounds. No murmur heard.  Pulmonary:      Effort: Pulmonary effort is normal.      Breath sounds: Normal breath sounds.   Abdominal:      General: Abdomen is flat. Bowel sounds are normal. There is no distension.      Palpations: Abdomen is soft. There is no mass.      Tenderness: There is no abdominal tenderness.      Hernia: No hernia is present.   Genitourinary:     Rectum: Guaiac result negative.      Comments: +external hemorrhoids.   Musculoskeletal:         General: Normal range of motion.      Cervical back: Normal range of motion and neck supple.   Lymphadenopathy:      Cervical: No cervical adenopathy.   Skin:     General: Skin is warm.      Capillary Refill: Capillary refill takes less than 2 seconds.      Coloration: Skin is not jaundiced.      Findings: No rash.   Neurological:      General: No focal deficit present.      Mental Status: He is alert and oriented to person, place, and time.      Cranial Nerves: No cranial nerve deficit.      Motor: No weakness.      Gait: Gait normal.   Psychiatric:         Mood and Affect: Mood " normal.         Behavior: Behavior normal.         Thought Content: Thought content normal.         Judgment: Judgment normal.             Assessment/Plan:     1. Type 2 diabetes mellitus without complication, without long-term current use of insulin ([facility])  Assessment & Plan:    Lab Results   Component Value Date    HGBA1C 10.3 (H) 08/13/2024   Your diabetic control is poor today (A1c 10.3%).  You mentioned that you recently restarted your Ozempic 2 mg weekly along with metformin 2000.  I would consult with your endocrinologist in the near future to discuss future treatment strategy.  I would also like to start you on losartan today; this should help your blood pressure as well as help to protect your kidneys.  Orders:  -     losartan (COZAAR) 25 mg tablet; Take 1 tablet (25 mg total) by mouth daily  2. Primary hypertension  Assessment & Plan:  Your blood pressure is somewhat suboptimal today (126/90).  Optimal is below 130/80.  I would recommend restarting a blood pressure medication today.  I will give you prescription for losartan 25 mg daily.  This medication will also help protect your kidneys from damage due to your diabetes.  Orders:  -     losartan (COZAAR) 25 mg tablet; Take 1 tablet (25 mg total) by mouth daily  3. Overweight (BMI 25.0-29.9)  Assessment & Plan:  Your weight today is 229.  Your body mass index (BMI) is 29.83.  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  I would recommend continuing to work on healthy diet.  I was also recommend restarting an exercise program (at least 150 minutes of aerobic exercise weekly).  I would like to suggest a goal weight of approximately 215 to 220 pounds.  4. NAFLD (nonalcoholic fatty liver disease)  Assessment & Plan:  Your abdominal ultrasound continues to show fatty infiltration in your liver.  Fortunately, your liver function enzymes were normal.  I would recommend continuing to work on improving diabetic control  and weight loss.  5. Mixed hyperlipidemia  Assessment & Plan:  Your cholesterol is under very good control today at 138, your LDL is also well-controlled at 65.  I would recommend continuing simvastatin 20 mg daily along with a heart healthy diet and regular exercise program.  6. Thoracic aortic ectasia (HCC)  Assessment & Plan:  Your ascending thoracic aorta is slightly widened.  This is called ectasia.  Fortunately it is stable compared to your previous imaging study from 2022.  I would recommend that we continue to follow this every 1 to 2 years.       Executive Physical Summary:     Overall, I think you are in pretty good health today.  Your skin exam today did not reveal any significant abnormalities.  I would recommend continued annual checkups with a dermatologist.  Your cardiology testing was negative (including EKG, stress test, and echocardiogram).     (Also please refer to individually listed diagnoses, cardiology, dermatology audiology, fitness, and nutrition reports for more information).    Your last colonoscopy was in July 2021.  Due to the presence of polyps, you were advised to have this repeated in 2026.    I reviewed your vaccination history today.  You have received the first few COVID vaccinations, but no recent boosters.  Due to the fact that you travel frequently for work, I would consider getting a booster this fall.  I would also recommend getting a flu shot at the same time.  Your last tetanus booster was in 2022 (next due by 2032).  Lastly, I would recommend getting a pneumonia vaccination due to your history of diabetes.    Lastly, I would recommend continuing a healthy diet (including vitamin D 2000 international units daily and calcium 1200 mg daily).  I would also recommend continuing your regular exercise program (at least 150 minutes of aerobic exercise weekly).           Oscar,    Thank you for choosing [facility].  It was a pleasure seeing you again today.  If you have  any questions regarding today's exam, feel free to contact me. We hope to see you again in the future.     Devonte Robb ([facility] Lead Physician)  [phone] (cell)  [email or web address]

## 2024-08-13 NOTE — ASSESSMENT & PLAN NOTE
Your ascending thoracic aorta is slightly widened.  This is called ectasia.  Fortunately it is stable compared to your previous imaging study from 2022.  I would recommend that we continue to follow this every 1 to 2 years.

## 2024-08-13 NOTE — ASSESSMENT & PLAN NOTE
Your cholesterol is under very good control today at 138, your LDL is also well-controlled at 65.  I would recommend continuing simvastatin 20 mg daily along with a heart healthy diet and regular exercise program.

## 2024-08-13 NOTE — PROGRESS NOTES
Heart and Vascular Summary:    Baseline ECG: Normal sinus rhythm, normal ECG.          Echocardiogram: Normal right and left ventricular size and function.  Normal valvular structure and function.  There is a slight increased diameter of your aorta, which is consistent with the measurement made in 2022 with your CT coronary calcium study.  There has been no growth thus far, but annual surveillance with echocardiogram or CT chest is recommended.          Stress Echocardiogram: Normal exercise capacity (10 mins), achieving 11.7 METS, and 103% of maximal predicted heart rate.  You had no changes on the ECG portion to suggest ischemia or blockage.  Blood pressure was normal at the start of the test, with a normal response to exercise (peak blood pressure of 164 systolic).  You had a good heart rate recovery after exercise.  Normal baseline left and right ventricular wall motion and function on echocardiogram with no wall motion abnormalities seen at peak stress to suggest any significant blockages in your coronary arteries over 50% that may require revascularization.            Lipid Profile: this revealed a total cholesterol of 138, LDL of 656HDL of 41, and a Triglyceride level of 161. The total cholesterol is a sum of its individual parts.  The HDL is the good cholesterol, which is protective to your heart.  Your level is above your goal of over 40.  The only way to raise this level is with increased activity/exercise.  The Triglycerides are a marker of your diet and genetics.  Less than 150 is what we aim for, and your level is mildly elevated.  Reducing the simple sugars and carbohydrates in your diet will help reduce this value.  The LDL is the bad cholesterol, the cholesterol that builds atherosclerotic plaque in your arteries.  Your level is at your goal of less than 120, which means that your statin medication is working well for you.  Reducing the trans-fats and saturated fats in your diet along with increasing  exercise can help improve this value.

## 2024-08-13 NOTE — ASSESSMENT & PLAN NOTE
Your abdominal ultrasound continues to show fatty infiltration in your liver.  Fortunately, your liver function enzymes were normal.  I would recommend continuing to work on improving diabetic control and weight loss.

## 2024-08-13 NOTE — ASSESSMENT & PLAN NOTE
Your blood pressure is somewhat suboptimal today (126/90).  Optimal is below 130/80.  I would recommend restarting a blood pressure medication today.  I will give you prescription for losartan 25 mg daily.  This medication will also help protect your kidneys from damage due to your diabetes.

## 2024-08-14 LAB
CHEST PAIN STATEMENT: NORMAL
MAX DIASTOLIC BP: 84 MMHG
MAX PREDICTED HEART RATE: 177 BPM
PROTOCOL NAME: NORMAL
STRESS POST EXERCISE DUR MIN: 10 MIN
STRESS POST EXERCISE DUR SEC: 0 SEC
STRESS POST PEAK HR: 184 BPM
STRESS POST PEAK SYSTOLIC BP: 164 MMHG
TARGET HR FORMULA: NORMAL
TEST INDICATION: NORMAL

## 2024-10-23 LAB
ALBUMIN SERPL-MCNC: 4.7 G/DL (ref 4.1–5.1)
ALBUMIN/CREAT UR: 2 MG/G CREAT (ref 0–29)
ALP SERPL-CCNC: 73 IU/L (ref 44–121)
ALT SERPL-CCNC: 32 IU/L (ref 0–44)
AST SERPL-CCNC: 18 IU/L (ref 0–40)
BILIRUB SERPL-MCNC: 0.7 MG/DL (ref 0–1.2)
BUN SERPL-MCNC: 12 MG/DL (ref 6–24)
BUN/CREAT SERPL: 12 (ref 9–20)
CALCIUM SERPL-MCNC: 9.3 MG/DL (ref 8.7–10.2)
CHLORIDE SERPL-SCNC: 104 MMOL/L (ref 96–106)
CHOLEST SERPL-MCNC: 119 MG/DL (ref 100–199)
CO2 SERPL-SCNC: 24 MMOL/L (ref 20–29)
CREAT SERPL-MCNC: 1.02 MG/DL (ref 0.76–1.27)
CREAT UR-MCNC: 275.6 MG/DL
EGFR: 94 ML/MIN/1.73
GLOBULIN SER-MCNC: 2.1 G/DL (ref 1.5–4.5)
GLUCOSE SERPL-MCNC: 115 MG/DL (ref 70–99)
HBA1C MFR BLD: 6.9 % (ref 4.8–5.6)
HDLC SERPL-MCNC: 40 MG/DL
LDLC SERPL CALC-MCNC: 65 MG/DL (ref 0–99)
MICROALBUMIN UR-MCNC: 5.5 UG/ML
POTASSIUM SERPL-SCNC: 4.8 MMOL/L (ref 3.5–5.2)
PROT SERPL-MCNC: 6.8 G/DL (ref 6–8.5)
SL AMB VLDL CHOLESTEROL CALC: 14 MG/DL (ref 5–40)
SODIUM SERPL-SCNC: 141 MMOL/L (ref 134–144)
TRIGL SERPL-MCNC: 69 MG/DL (ref 0–149)

## 2024-11-06 ENCOUNTER — OFFICE VISIT (OUTPATIENT)
Dept: ENDOCRINOLOGY | Facility: HOSPITAL | Age: 44
End: 2024-11-06
Payer: COMMERCIAL

## 2024-11-06 VITALS
BODY MASS INDEX: 30.9 KG/M2 | DIASTOLIC BLOOD PRESSURE: 90 MMHG | SYSTOLIC BLOOD PRESSURE: 116 MMHG | HEIGHT: 74 IN | HEART RATE: 99 BPM | WEIGHT: 240.8 LBS

## 2024-11-06 DIAGNOSIS — E11.9 TYPE 2 DIABETES MELLITUS WITHOUT COMPLICATION, WITHOUT LONG-TERM CURRENT USE OF INSULIN (HCC): ICD-10-CM

## 2024-11-06 PROCEDURE — 99214 OFFICE O/P EST MOD 30 MIN: CPT | Performed by: PHYSICIAN ASSISTANT

## 2024-11-06 NOTE — PATIENT INSTRUCTIONS
Monitor diet maintain physical activity.    Continue Ozempic 2 mg weekly and metformin 500 mg twice a day.    Recommend checking blood sugars daily.    Contact the office with any concerns or questions.    Follow-up in 6 months with labwork completed prior to visit.

## 2024-11-06 NOTE — PROGRESS NOTES
Osorio Brown 43 y.o. male MRN: [id number]    Encounter: [id number]      Assessment & Plan     Assessment:  This is a 43 y.o.-year-old male with type 2 diabetes with hypertension and hyperlipidemia.    Plan:  1.  Type 2 diabetes: Most recent hemoglobin A1c has significantly improved to 6.9.  He is now within target range.  Likely cause of his elevated A1c at prior office visit was not being on his Ozempic.  Since being on it things have been doing much better.  He will continue with Ozempic 2 mg weekly and metformin 500 mg twice a day.  He will use freestyle rikki occasionally as he is paying out-of-pocket to help him manage his glucose levels.  He will contact the office with any concerns or questions.  At this time we will have him follow-up in 6 months with labwork completed prior to visit.    2.  Hypertension: Normotensive in office with stable kidney function and normal electrolytes.  Continue losartan 25 mg daily.  Repeat CMP prior to next office visit.    3.  Hyperlipidemia: Lipid panel excellent.  Continue with simvastatin 20 mg daily.  Will continue to monitor over time.    CC: Type 2 diabetes follow-up    History of Present Illness     HPI:  Osorio Brown is a 43 y.o. year old male with type 2 diabetes diagnosed approximately 2016.  He is on oral agents and insulin at home and takes Ozempic 2 mg weekly and metformin 500 mg 2 times a day.  Earlier this year had issues receiving his Ozempic which was likely the cause for his increase in A1c.  Has noticed better glucose level since restarting medication He denies any polyuria, polydipsia, nocturia and blurry vision.  He denies neuropathy, nephropathy, retinopathy, heart attack, stroke, and claudication.  Does follow diabetic diet limiting the amount of carbs he eats.  Is active on a daily basis.    Hypoglycemic episodes: No, none recently.  Did have some episodes of hypoglycemia after restarting Ozempic and being on a higher dose of metformin, but cut  back his metformin and has been doing much better.    The patient's last eye exam was in July 2024.  The patient's last foot exam was in October 2022.    Blood Sugar/Glucometer/Pump/CGM review: No blood sugar logs present at today's office visit.    For hypertension he is currently on losartan 25 mg daily.  For hyperlipidemia he is currently on simvastatin 20 mg daily.  Denies any headaches, vision changes, chest pain, MI or strokelike symptoms.     Review of Systems   Constitutional:  Negative for activity change, appetite change, fatigue and unexpected weight change.   HENT:  Negative for trouble swallowing.    Eyes:  Negative for visual disturbance.   Respiratory:  Negative for chest tightness and shortness of breath.    Cardiovascular:  Negative for chest pain, palpitations and leg swelling.   Gastrointestinal:  Negative for abdominal pain, diarrhea, nausea and vomiting.   Endocrine: Negative for cold intolerance, heat intolerance, polydipsia, polyphagia and polyuria.   Genitourinary:  Negative for frequency.   Skin:  Negative for rash and wound.   Neurological:  Negative for dizziness, weakness, light-headedness, numbness and headaches.   Psychiatric/Behavioral:  Negative for dysphoric mood and sleep disturbance. The patient is not nervous/anxious.        Historical Information   Past Medical History:   Diagnosis Date    Diabetes mellitus (HCC)     History of colon polyps     Hyperlipidemia     Hypertension     NAFLD (nonalcoholic fatty liver disease)      Past Surgical History:   Procedure Laterality Date    EYE SURGERY  2012    LASIK    TONSILLECTOMY Bilateral 2002     Social History   Social History     Substance and Sexual Activity   Alcohol Use Yes    Alcohol/week: 2.0 standard drinks of alcohol    Types: 2 Standard drinks or equivalent per week     Social History     Substance and Sexual Activity   Drug Use Never     Social History     Tobacco Use   Smoking Status Never   Smokeless Tobacco Never  "    Family History:   Family History   Problem Relation Age of Onset    Hypertension Mother     Hyperlipidemia Mother     Diabetes Mother     Diabetes type II Mother     Hypertension Father     Diabetes Father        Meds/Allergies   Current Outpatient Medications   Medication Sig Dispense Refill    Continuous Blood Gluc  (FreeStyle Cassie 14 Day [place]) JESSA USE 1 SENSOR EVERY 14 DAYS      Continuous Blood Gluc Sensor (FreeStyle Cassie 2 Sensor) MISC Use 1 each every 14 (fourteen) days 6 each 1    losartan (COZAAR) 25 mg tablet Take 1 tablet (25 mg total) by mouth daily 90 tablet 1    metFORMIN (GLUCOPHAGE-XR) 500 mg 24 hr tablet Take 4 tablets (2,000 mg total) by mouth daily with dinner 400 tablet 6    semaglutide, 2 mg/dose, (Ozempic, 2 MG/DOSE,) 8 mg/ mL injection pen Inject 0.75 mL (2 mg total) under the skin every 7 days 9 mL 1    simvastatin (ZOCOR) 20 mg tablet TAKE 1 TABLET BY MOUTH DAILY AT BEDTIME 30 tablet 5     No current facility-administered medications for this visit.     No Known Allergies    Objective   Vitals: Height 6' 1.5\" (1.867 m), weight 109 kg (240 lb 12.8 oz).    Physical Exam  Vitals and nursing note reviewed.   Constitutional:       General: He is not in acute distress.     Appearance: Normal appearance.   HENT:      Head: Normocephalic and atraumatic.   Eyes:      General: No scleral icterus.     Extraocular Movements: Extraocular movements intact.      Conjunctiva/sclera: Conjunctivae normal.      Pupils: Pupils are equal, round, and reactive to light.   Cardiovascular:      Rate and Rhythm: Normal rate and regular rhythm.      Pulses: no weak pulses.           Dorsalis pedis pulses are 2+ on the right side and 2+ on the left side.        Posterior tibial pulses are 2+ on the right side and 2+ on the left side.      Heart sounds: No murmur heard.  Pulmonary:      Effort: Pulmonary effort is normal. No respiratory distress.      Breath sounds: Normal breath sounds. No wheezing. "   Musculoskeletal:      Cervical back: Normal range of motion.      Right lower leg: No edema.      Left lower leg: No edema.   Feet:      Right foot:      Skin integrity: No ulcer, skin breakdown, erythema, warmth, callus or dry skin.      Left foot:      Skin integrity: No ulcer, skin breakdown, erythema, warmth, callus or dry skin.   Lymphadenopathy:      Cervical: No cervical adenopathy.   Skin:     General: Skin is warm and dry.   Neurological:      Mental Status: He is alert and oriented to person, place, and time. Mental status is at baseline.      Sensory: No sensory deficit.      Gait: Gait normal.   Psychiatric:         Mood and Affect: Mood normal.         Behavior: Behavior normal.         Thought Content: Thought content normal.     Patient's shoes and socks removed.    Right Foot/Ankle   Right Foot Inspection  Skin Exam: skin normal and skin intact. No dry skin, no warmth, no callus, no erythema, no maceration, no abnormal color, no pre-ulcer, no ulcer and no callus.     Toe Exam: ROM and strength within normal limits. No tenderness, erythema and  no right toe deformity    Sensory   Proprioception: intact  Monofilament testing: intact    Vascular  Capillary refills: < 3 seconds  The right DP pulse is 2+. The right PT pulse is 2+.     Left Foot/Ankle  Left Foot Inspection  Skin Exam: skin normal and skin intact. No dry skin, no warmth, no erythema, no maceration, normal color, no pre-ulcer, no ulcer and no callus.     Toe Exam: ROM and strength within normal limits. No tenderness, no erythema and no left toe deformity.     Sensory   Proprioception: intact  Monofilament testing: intact    Vascular  Capillary refills: < 3 seconds  The left DP pulse is 2+. The left PT pulse is 2+.     Assign Risk Category  No deformity present  No loss of protective sensation  No weak pulses  Risk: 0        The history was obtained from the review of the chart, patient.    Lab Results:   Lab Results   Component Value  Date/Time    Hemoglobin A1C 6.9 (H) 10/22/2024 09:31 AM    Hemoglobin A1C 10.3 (H) 08/13/2024 08:09 AM    Hemoglobin A1C 14.0 (H) 07/08/2024 08:10 AM    Hemoglobin A1C 6.3 (H) 12/12/2023 08:31 AM    WBC 6.34 08/13/2024 08:09 AM    Hemoglobin 16.3 08/13/2024 08:09 AM    Hematocrit 47.9 08/13/2024 08:09 AM    MCV 94 08/13/2024 08:09 AM    Platelets 277 08/13/2024 08:09 AM    BUN 12 10/22/2024 09:31 AM    BUN 12 08/13/2024 08:09 AM    BUN 11 07/08/2024 08:10 AM    BUN 11 12/12/2023 08:31 AM    Potassium 4.8 10/22/2024 09:31 AM    Potassium 4.3 08/13/2024 08:09 AM    Potassium 4.2 07/08/2024 08:10 AM    Potassium 4.8 12/12/2023 08:31 AM    Chloride 104 10/22/2024 09:31 AM    Chloride 100 08/13/2024 08:09 AM    Chloride 101 07/08/2024 08:10 AM    Chloride 102 12/12/2023 08:31 AM    CO2 24 10/22/2024 09:31 AM    CO2 27 08/13/2024 08:09 AM    CO2 20 07/08/2024 08:10 AM    CO2 22 12/12/2023 08:31 AM    Creatinine 1.02 10/22/2024 09:31 AM    Creatinine 0.97 08/13/2024 08:09 AM    Creatinine 1.09 07/08/2024 08:10 AM    Creatinine 1.17 12/12/2023 08:31 AM    AST 18 10/22/2024 09:31 AM    AST 13 08/13/2024 08:09 AM    AST 17 07/08/2024 08:10 AM    AST 19 12/12/2023 08:31 AM    ALT 32 10/22/2024 09:31 AM    ALT 20 08/13/2024 08:09 AM    ALT 23 07/08/2024 08:10 AM    ALT 24 12/12/2023 08:31 AM    Total Protein 7.3 08/13/2024 08:09 AM    Protein, Total 6.8 10/22/2024 09:31 AM    Protein, Total 6.8 07/08/2024 08:10 AM    Protein, Total 7.1 12/12/2023 08:31 AM    Albumin 4.7 10/22/2024 09:31 AM    Albumin 4.7 08/13/2024 08:09 AM    Albumin 4.4 07/08/2024 08:10 AM    Albumin 4.7 12/12/2023 08:31 AM    Globulin, Total 2.1 10/22/2024 09:31 AM    Globulin, Total 2.4 07/08/2024 08:10 AM    Globulin, Total 2.4 12/12/2023 08:31 AM    HDL 40 10/22/2024 09:31 AM    HDL 32 (L) 07/08/2024 08:10 AM    HDL 40 12/12/2023 08:31 AM    HDL, Direct 41 08/13/2024 08:09 AM    Triglycerides 69 10/22/2024 09:31 AM    Triglycerides 161 (H) 08/13/2024  "08:09 AM    Triglycerides 365 (H) 07/08/2024 08:10 AM    Triglycerides 169 (H) 12/12/2023 08:31 AM           Imaging Studies: Results Review Statement: No pertinent imaging studies reviewed.    Portions of the record may have been created with voice recognition software. Occasional wrong word or \"sound a like\" substitutions may have occurred due to the inherent limitations of voice recognition software. Read the chart carefully and recognize, using context, where substitutions have occurred.    "

## 2024-12-18 ENCOUNTER — TELEPHONE (OUTPATIENT)
Dept: ENDOCRINOLOGY | Facility: HOSPITAL | Age: 44
End: 2024-12-18

## 2024-12-18 NOTE — TELEPHONE ENCOUNTER
Patient is paying out of pocket since FreeStyle is not preferred. Can he use Dexcom? It is the preferred device with insurance

## 2024-12-18 NOTE — TELEPHONE ENCOUNTER
He is not on insulin, so I do not think the Dexcom would be covered either.  He did not express concerns with paying for the freestyle rikki out-of-pocket.

## 2025-03-18 DIAGNOSIS — E11.9 TYPE 2 DIABETES MELLITUS WITHOUT COMPLICATION, WITHOUT LONG-TERM CURRENT USE OF INSULIN (HCC): ICD-10-CM

## 2025-03-18 RX ORDER — SIMVASTATIN 20 MG
20 TABLET ORAL
Qty: 30 TABLET | Refills: 5 | Status: SHIPPED | OUTPATIENT
Start: 2025-03-18

## 2025-04-22 DIAGNOSIS — E11.9 TYPE 2 DIABETES MELLITUS WITHOUT COMPLICATION, WITHOUT LONG-TERM CURRENT USE OF INSULIN (HCC): ICD-10-CM

## 2025-04-22 RX ORDER — SEMAGLUTIDE 2.68 MG/ML
INJECTION, SOLUTION SUBCUTANEOUS
Qty: 3 ML | Refills: 5 | Status: SHIPPED | OUTPATIENT
Start: 2025-04-22

## 2025-04-23 DIAGNOSIS — I10 PRIMARY HYPERTENSION: ICD-10-CM

## 2025-04-23 DIAGNOSIS — E11.9 TYPE 2 DIABETES MELLITUS WITHOUT COMPLICATION, WITHOUT LONG-TERM CURRENT USE OF INSULIN (HCC): ICD-10-CM

## 2025-04-23 RX ORDER — LOSARTAN POTASSIUM 25 MG/1
25 TABLET ORAL DAILY
Qty: 30 TABLET | Refills: 0 | Status: SHIPPED | OUTPATIENT
Start: 2025-04-23

## 2025-05-03 LAB
ALBUMIN SERPL-MCNC: 4.7 G/DL (ref 4.1–5.1)
ALP SERPL-CCNC: 81 IU/L (ref 44–121)
ALT SERPL-CCNC: 47 IU/L (ref 0–44)
AST SERPL-CCNC: 27 IU/L (ref 0–40)
BILIRUB SERPL-MCNC: 0.5 MG/DL (ref 0–1.2)
BUN SERPL-MCNC: 12 MG/DL (ref 6–24)
BUN/CREAT SERPL: 11 (ref 9–20)
CALCIUM SERPL-MCNC: 9.2 MG/DL (ref 8.7–10.2)
CHLORIDE SERPL-SCNC: 102 MMOL/L (ref 96–106)
CO2 SERPL-SCNC: 20 MMOL/L (ref 20–29)
CREAT SERPL-MCNC: 1.06 MG/DL (ref 0.76–1.27)
EGFR: 89 ML/MIN/1.73
GLOBULIN SER-MCNC: 2 G/DL (ref 1.5–4.5)
GLUCOSE SERPL-MCNC: 143 MG/DL (ref 70–99)
HBA1C MFR BLD: 7.4 % (ref 4.8–5.6)
POTASSIUM SERPL-SCNC: 4.6 MMOL/L (ref 3.5–5.2)
PROT SERPL-MCNC: 6.7 G/DL (ref 6–8.5)
SODIUM SERPL-SCNC: 139 MMOL/L (ref 134–144)

## 2025-05-05 ENCOUNTER — RESULTS FOLLOW-UP (OUTPATIENT)
Dept: ENDOCRINOLOGY | Facility: HOSPITAL | Age: 45
End: 2025-05-05

## 2025-05-27 DIAGNOSIS — I10 PRIMARY HYPERTENSION: ICD-10-CM

## 2025-05-27 DIAGNOSIS — E11.9 TYPE 2 DIABETES MELLITUS WITHOUT COMPLICATION, WITHOUT LONG-TERM CURRENT USE OF INSULIN (HCC): ICD-10-CM

## 2025-05-27 RX ORDER — LOSARTAN POTASSIUM 25 MG/1
25 TABLET ORAL DAILY
Qty: 90 TABLET | Refills: 1 | Status: SHIPPED | OUTPATIENT
Start: 2025-05-27

## 2025-06-23 ENCOUNTER — TELEPHONE (OUTPATIENT)
Age: 45
End: 2025-06-23

## 2025-06-23 ENCOUNTER — OFFICE VISIT (OUTPATIENT)
Dept: ENDOCRINOLOGY | Facility: HOSPITAL | Age: 45
End: 2025-06-23
Payer: COMMERCIAL

## 2025-06-23 VITALS
SYSTOLIC BLOOD PRESSURE: 124 MMHG | DIASTOLIC BLOOD PRESSURE: 86 MMHG | BODY MASS INDEX: 30.93 KG/M2 | HEIGHT: 74 IN | HEART RATE: 76 BPM | WEIGHT: 241 LBS

## 2025-06-23 DIAGNOSIS — E66.811 CLASS 1 OBESITY DUE TO EXCESS CALORIES WITH SERIOUS COMORBIDITY AND BODY MASS INDEX (BMI) OF 32.0 TO 32.9 IN ADULT: ICD-10-CM

## 2025-06-23 DIAGNOSIS — E66.09 CLASS 1 OBESITY DUE TO EXCESS CALORIES WITH SERIOUS COMORBIDITY AND BODY MASS INDEX (BMI) OF 32.0 TO 32.9 IN ADULT: ICD-10-CM

## 2025-06-23 DIAGNOSIS — I10 PRIMARY HYPERTENSION: ICD-10-CM

## 2025-06-23 DIAGNOSIS — E11.9 TYPE 2 DIABETES MELLITUS WITHOUT COMPLICATION, WITHOUT LONG-TERM CURRENT USE OF INSULIN (HCC): Primary | ICD-10-CM

## 2025-06-23 PROCEDURE — 99214 OFFICE O/P EST MOD 30 MIN: CPT | Performed by: STUDENT IN AN ORGANIZED HEALTH CARE EDUCATION/TRAINING PROGRAM

## 2025-06-23 RX ORDER — OXYCODONE HYDROCHLORIDE 5 MG/1
5 CAPSULE ORAL EVERY 4 HOURS PRN
COMMUNITY
Start: 2025-06-13

## 2025-06-23 RX ORDER — HYDROCHLOROTHIAZIDE 12.5 MG/1
1 CAPSULE ORAL
Qty: 10 EACH | Refills: 1 | Status: SHIPPED | OUTPATIENT
Start: 2025-06-23

## 2025-06-23 NOTE — TELEPHONE ENCOUNTER
PA for freestyle rikki 3 plus sensor SUBMITTED to future scripts    via    []CMM-KEY:   [x]Surescripts-Case ID #   [id number]     []Availity-Auth ID # NDC #   []Faxed to plan   []Other website   []Phone call Case ID #     [x]PA sent as URGENT    All office notes, labs and other pertaining documents and studies sent. Clinical questions answered. Awaiting determination from insurance company.     Turnaround time for your insurance to make a decision on your Prior Authorization can take 7-21 business days.

## 2025-06-24 NOTE — TELEPHONE ENCOUNTER
PA for freestyle rikki 3 sensor DENIED    Reason:(Screenshot if applicable)        Message sent to office clinical pool Yes    Denial letter scanned into Media Yes    We can gladly do an appeal but the process can take about 30-60 days to provide determination. Please have the office staff schedule a Peer to Peer at phone [phone] . If an appeal is truly warranted please have Provider send clinical documentation to the PA department to support the appeal.     **Please follow up with your patient regarding denial and next steps**

## 2025-06-24 NOTE — TELEPHONE ENCOUNTER
Good Day,    Please have Provider either write a letter of need or addend patient note. Clinical information stating hypoglycemic and/or current use of insulin were part of original denial. Did not see any CGM report uploaded into patient chart.  To submit same information will result in another denial. Please inform when patient chart updated and will submit appeal.    Thank you.

## 2025-08-13 ENCOUNTER — TELEPHONE (OUTPATIENT)
Age: 45
End: 2025-08-13

## 2025-08-17 DIAGNOSIS — E11.9 TYPE 2 DIABETES MELLITUS WITHOUT COMPLICATION, WITHOUT LONG-TERM CURRENT USE OF INSULIN (HCC): ICD-10-CM

## 2025-08-19 RX ORDER — METFORMIN HYDROCHLORIDE 500 MG/1
2000 TABLET, EXTENDED RELEASE ORAL
Qty: 120 TABLET | Refills: 5 | Status: SHIPPED | OUTPATIENT
Start: 2025-08-19